# Patient Record
Sex: MALE | Race: WHITE | Employment: OTHER | URBAN - METROPOLITAN AREA
[De-identification: names, ages, dates, MRNs, and addresses within clinical notes are randomized per-mention and may not be internally consistent; named-entity substitution may affect disease eponyms.]

---

## 2020-12-18 ENCOUNTER — TELEPHONE (OUTPATIENT)
Dept: SURGERY | Age: 35
End: 2020-12-18

## 2020-12-18 NOTE — TELEPHONE ENCOUNTER
Left a voicemail on 12/18/20 for patient to return my call at their earliest convenience to reschedule their missed appointment that was scheduled for 12/18/20.

## 2021-01-13 NOTE — TELEPHONE ENCOUNTER
Called pt to get consult rescheduled, everything is on hold at this time as his wife was just diagnosed with cancer. He will call us back to reschedule when he is able.

## 2021-09-10 ENCOUNTER — HOSPITAL ENCOUNTER (OUTPATIENT)
Age: 36
Discharge: HOME | End: 2021-09-10

## 2021-09-10 PROCEDURE — 84403 ASSAY OF TOTAL TESTOSTERONE: CPT

## 2021-09-10 PROCEDURE — 84270 ASSAY OF SEX HORMONE GLOBUL: CPT

## 2021-09-14 LAB
SEX HORMONE BINDING GLOBULIN: 14 NMOL/L (ref 11–80)
TESTOSTERONE FREE-NONMALE: 72.8 PG/ML (ref 47–244)
TESTOSTERONE TOTAL: 252 NG/DL (ref 220–1000)

## 2021-11-02 ENCOUNTER — OFFICE VISIT (OUTPATIENT)
Dept: FAMILY MEDICINE CLINIC | Age: 36
End: 2021-11-02

## 2021-11-02 VITALS
DIASTOLIC BLOOD PRESSURE: 88 MMHG | SYSTOLIC BLOOD PRESSURE: 134 MMHG | OXYGEN SATURATION: 98 % | HEIGHT: 75 IN | TEMPERATURE: 98 F | RESPIRATION RATE: 16 BRPM | HEART RATE: 102 BPM | WEIGHT: 315 LBS | BODY MASS INDEX: 39.17 KG/M2

## 2021-11-02 DIAGNOSIS — B96.89 ACUTE BACTERIAL SINUSITIS: Primary | ICD-10-CM

## 2021-11-02 DIAGNOSIS — J01.90 ACUTE BACTERIAL SINUSITIS: Primary | ICD-10-CM

## 2021-11-02 PROCEDURE — 99213 OFFICE O/P EST LOW 20 MIN: CPT | Performed by: FAMILY MEDICINE

## 2021-11-02 RX ORDER — AMOXICILLIN AND CLAVULANATE POTASSIUM 875; 125 MG/1; MG/1
1 TABLET, FILM COATED ORAL 2 TIMES DAILY
Qty: 20 TABLET | Refills: 0 | Status: SHIPPED
Start: 2021-11-02 | End: 2021-11-05 | Stop reason: ALTCHOICE

## 2021-11-02 RX ORDER — SILDENAFIL CITRATE 20 MG/1
TABLET ORAL
COMMUNITY
Start: 2021-10-08

## 2021-11-02 RX ORDER — BUSPIRONE HYDROCHLORIDE 10 MG/1
TABLET ORAL
COMMUNITY
Start: 2021-10-20

## 2021-11-02 RX ORDER — TELMISARTAN 80 MG/1
TABLET ORAL
COMMUNITY
Start: 2021-09-03

## 2021-11-02 RX ORDER — HYDROCHLOROTHIAZIDE 25 MG/1
TABLET ORAL
COMMUNITY
Start: 2021-09-02

## 2021-11-02 RX ORDER — ESCITALOPRAM OXALATE 20 MG/1
TABLET ORAL
COMMUNITY

## 2021-11-02 RX ORDER — METFORMIN HYDROCHLORIDE 500 MG/1
TABLET, EXTENDED RELEASE ORAL
COMMUNITY
Start: 2021-10-08

## 2021-11-02 RX ORDER — METOPROLOL SUCCINATE 100 MG/1
TABLET, EXTENDED RELEASE ORAL
COMMUNITY
Start: 2021-09-02

## 2021-11-02 ASSESSMENT — ENCOUNTER SYMPTOMS
DIARRHEA: 0
COUGH: 1
WHEEZING: 0
VOMITING: 0
SHORTNESS OF BREATH: 0
SINUS PRESSURE: 1
RHINORRHEA: 1
EYE REDNESS: 0
SORE THROAT: 1
SINUS COMPLAINT: 1
NAUSEA: 0

## 2021-11-02 NOTE — PROGRESS NOTES
Chief Complaint:     Chief Complaint   Patient presents with    Sinus Problem     x3 days, ha dnegative covid saturday    Drainage         Sinus Problem  This is a new problem. The current episode started in the past 7 days. The problem is unchanged. There has been no fever. The pain is mild. Associated symptoms include congestion, coughing, ear pain, headaches, sinus pressure and a sore throat. Pertinent negatives include no chills or shortness of breath. Past treatments include nothing. The treatment provided no relief. There is no problem list on file for this patient. Past Medical History:   Diagnosis Date    Hypertension        Past Surgical History:   Procedure Laterality Date    MYRINGOTOMY      x 5       Current Outpatient Medications   Medication Sig Dispense Refill    escitalopram (LEXAPRO) 20 MG tablet escitalopram 20 mg tablet   TAKE 1 TABLET BY MOUTH EVERY DAY      busPIRone (BUSPAR) 10 MG tablet TAKE 1 TABLET BY MOUTH TWICE DAILY AS NEEDED      metoprolol succinate (TOPROL XL) 100 MG extended release tablet TAKE 1 TABLET BY MOUTH EVERY DAY      hydroCHLOROthiazide (HYDRODIURIL) 25 MG tablet TAKE 1 TABLET BY MOUTH EVERY DAY      telmisartan (MICARDIS) 80 MG tablet TAKE 1 TABLET BY MOUTH EVERY DAY      metFORMIN (GLUCOPHAGE-XR) 500 MG extended release tablet TAKE 1 TABLET BY MOUTH DAILY AFTER DINNER. IF TOLERATED INCREASE BY 1 TABLET EVERY 7 DAYS UP TO 2 TABLETS AFTER LUNCH AND 2 TABLETS AFTER DINNER      sildenafil (REVATIO) 20 MG tablet TAKE 1 TABLET BY MOUTH FIVE TIMES DAILY AS NEEDED      amoxicillin-clavulanate (AUGMENTIN) 875-125 MG per tablet Take 1 tablet by mouth 2 times daily for 10 days 20 tablet 0     No current facility-administered medications for this visit.        Allergies   Allergen Reactions    Sulfa Antibiotics Anaphylaxis       Social History     Socioeconomic History    Marital status: Single     Spouse name: Not on file    Number of children: Not on file  Years of education: Not on file    Highest education level: Not on file   Occupational History    Not on file   Tobacco Use    Smoking status: Former Smoker    Smokeless tobacco: Current User   Substance and Sexual Activity    Alcohol use: No     Comment: occ    Drug use: No    Sexual activity: Not on file   Other Topics Concern    Not on file   Social History Narrative    Not on file     Social Determinants of Health     Financial Resource Strain:     Difficulty of Paying Living Expenses:    Food Insecurity:     Worried About Running Out of Food in the Last Year:     [address] in the Last Year:    Transportation Needs:     Lack of Transportation (Medical):  Lack of Transportation (Non-Medical):    Physical Activity:     Days of Exercise per Week:     Minutes of Exercise per Session:    Stress:     Feeling of Stress :    Social Connections:     Frequency of Communication with Friends and Family:     Frequency of Social Gatherings with Friends and Family:     Attends [employer] Services:     Active Member of Clubs or Organizations:     Attends Club or Organization Meetings:     Marital Status:    Intimate Partner Violence:     Fear of Current or Ex-Partner:     Emotionally Abused:     Physically Abused:     Sexually Abused:        No family history on file. Review of Systems   Constitutional: Positive for fever. Negative for chills. HENT: Positive for congestion, ear pain, postnasal drip, rhinorrhea, sinus pressure and sore throat. Negative for ear discharge. Eyes: Negative for redness. Respiratory: Positive for cough. Negative for shortness of breath and wheezing. Gastrointestinal: Negative for diarrhea, nausea and vomiting. Skin: Negative for rash. Neurological: Positive for headaches. All other systems reviewed and are negative.         /88   Pulse 102   Temp 98 °F (36.7 °C)   Resp 16   Ht 6' 3\" (1.905 m)   Wt (!) 494 lb (224.1 kg)   SpO2 98% Findings: No erythema or rash. Neurological:      General: No focal deficit present. Mental Status: He is alert. Psychiatric:         Mood and Affect: Mood normal.                                 ASSESSMENT/PLAN:    There is no problem list on file for this patient. Noemí Case was seen today for sinus problem and drainage. Diagnoses and all orders for this visit:    Acute bacterial sinusitis    Other orders  -     amoxicillin-clavulanate (AUGMENTIN) 875-125 MG per tablet; Take 1 tablet by mouth 2 times daily for 10 days          Return if symptoms worsen or fail to improve. I spent 20 minutes with this patient. I spent greater than 50% of the time counseling this patient.         Ramiro Brown DO  11/2/2021  2:22 PM

## 2021-11-05 ENCOUNTER — OFFICE VISIT (OUTPATIENT)
Dept: FAMILY MEDICINE CLINIC | Age: 36
End: 2021-11-05
Payer: OTHER GOVERNMENT

## 2021-11-05 ENCOUNTER — HOSPITAL ENCOUNTER (OUTPATIENT)
Dept: INFUSION THERAPY | Age: 36
Setting detail: INFUSION SERIES
Discharge: HOME | End: 2021-11-05
Payer: OTHER GOVERNMENT

## 2021-11-05 VITALS
HEART RATE: 87 BPM | RESPIRATION RATE: 16 BRPM | OXYGEN SATURATION: 92 % | DIASTOLIC BLOOD PRESSURE: 60 MMHG | SYSTOLIC BLOOD PRESSURE: 130 MMHG | TEMPERATURE: 98 F

## 2021-11-05 VITALS
HEIGHT: 75 IN | BODY MASS INDEX: 39.17 KG/M2 | WEIGHT: 315 LBS | HEART RATE: 109 BPM | OXYGEN SATURATION: 93 % | SYSTOLIC BLOOD PRESSURE: 136 MMHG | DIASTOLIC BLOOD PRESSURE: 82 MMHG | TEMPERATURE: 99 F

## 2021-11-05 DIAGNOSIS — U07.1 COVID: Primary | ICD-10-CM

## 2021-11-05 DIAGNOSIS — U07.1 COVID-19: ICD-10-CM

## 2021-11-05 DIAGNOSIS — U07.1 COVID: ICD-10-CM

## 2021-11-05 PROCEDURE — M0243 CASIRIVI AND IMDEVI INFUSION: HCPCS

## 2021-11-05 PROCEDURE — 96365 THER/PROPH/DIAG IV INF INIT: CPT

## 2021-11-05 PROCEDURE — 2500000003 HC RX 250 WO HCPCS: Performed by: INTERNAL MEDICINE

## 2021-11-05 PROCEDURE — 99203 OFFICE O/P NEW LOW 30 MIN: CPT | Performed by: PHYSICIAN ASSISTANT

## 2021-11-05 PROCEDURE — 6360000002 HC RX W HCPCS: Performed by: INTERNAL MEDICINE

## 2021-11-05 PROCEDURE — 2580000003 HC RX 258: Performed by: INTERNAL MEDICINE

## 2021-11-05 RX ORDER — PREDNISONE 20 MG/1
40 TABLET ORAL DAILY
Qty: 10 TABLET | Refills: 0 | Status: ON HOLD
Start: 2021-11-05 | End: 2021-11-09 | Stop reason: HOSPADM

## 2021-11-05 RX ORDER — SODIUM CHLORIDE 0.9 % (FLUSH) 0.9 %
5-40 SYRINGE (ML) INJECTION PRN
Status: CANCELLED | OUTPATIENT
Start: 2021-11-05

## 2021-11-05 RX ORDER — SODIUM CHLORIDE 9 MG/ML
INJECTION, SOLUTION INTRAVENOUS CONTINUOUS
Status: CANCELLED | OUTPATIENT
Start: 2021-11-05

## 2021-11-05 RX ORDER — DIPHENHYDRAMINE HYDROCHLORIDE 50 MG/ML
50 INJECTION INTRAMUSCULAR; INTRAVENOUS ONCE
Status: CANCELLED | OUTPATIENT
Start: 2021-11-05 | End: 2021-11-05

## 2021-11-05 RX ORDER — AZITHROMYCIN 250 MG/1
250 TABLET, FILM COATED ORAL SEE ADMIN INSTRUCTIONS
Qty: 6 TABLET | Refills: 0 | Status: ON HOLD
Start: 2021-11-05 | End: 2021-11-09 | Stop reason: HOSPADM

## 2021-11-05 RX ORDER — SODIUM CHLORIDE 9 MG/ML
INJECTION, SOLUTION INTRAVENOUS CONTINUOUS
Status: DISCONTINUED | OUTPATIENT
Start: 2021-11-05 | End: 2021-11-06 | Stop reason: HOSPADM

## 2021-11-05 RX ORDER — SODIUM CHLORIDE 9 MG/ML
INJECTION, SOLUTION INTRAVENOUS CONTINUOUS
OUTPATIENT
Start: 2021-11-05

## 2021-11-05 RX ORDER — SODIUM CHLORIDE 0.9 % (FLUSH) 0.9 %
5-40 SYRINGE (ML) INJECTION PRN
Status: DISCONTINUED | OUTPATIENT
Start: 2021-11-05 | End: 2021-11-06 | Stop reason: HOSPADM

## 2021-11-05 RX ORDER — METHYLPREDNISOLONE SODIUM SUCCINATE 125 MG/2ML
125 INJECTION, POWDER, LYOPHILIZED, FOR SOLUTION INTRAMUSCULAR; INTRAVENOUS ONCE
Status: CANCELLED | OUTPATIENT
Start: 2021-11-05 | End: 2021-11-05

## 2021-11-05 RX ORDER — DIPHENHYDRAMINE HYDROCHLORIDE 50 MG/ML
50 INJECTION INTRAMUSCULAR; INTRAVENOUS ONCE
OUTPATIENT
Start: 2021-11-05 | End: 2021-11-05

## 2021-11-05 RX ORDER — METHYLPREDNISOLONE SODIUM SUCCINATE 125 MG/2ML
125 INJECTION, POWDER, LYOPHILIZED, FOR SOLUTION INTRAMUSCULAR; INTRAVENOUS ONCE
OUTPATIENT
Start: 2021-11-05 | End: 2021-11-05

## 2021-11-05 RX ORDER — EPINEPHRINE 1 MG/ML
0.3 INJECTION, SOLUTION, CONCENTRATE INTRAVENOUS PRN
Status: CANCELLED | OUTPATIENT
Start: 2021-11-05

## 2021-11-05 RX ORDER — BENZONATATE 100 MG/1
100 CAPSULE ORAL 3 TIMES DAILY PRN
Qty: 21 CAPSULE | Refills: 0 | Status: SHIPPED | OUTPATIENT
Start: 2021-11-05 | End: 2021-11-12

## 2021-11-05 RX ORDER — EPINEPHRINE 1 MG/ML
0.3 INJECTION, SOLUTION, CONCENTRATE INTRAVENOUS PRN
OUTPATIENT
Start: 2021-11-05

## 2021-11-05 NOTE — PROGRESS NOTES
21  Barbara Lewis : 1985 Sex: male  Age 39 y.o. Subjective:  Chief Complaint   Patient presents with    Shortness of Breath     Covid possible     Nasal Congestion    Cough    Fatigue    Other     dry mouth         HPI:   Barbara Lewis , 39 y.o. male presents to [facility] care for evaluation of shortness of breath, congestion, drainage    HPI  79-year-old male presents to [facility] for evaluation of Covid positive. The patient states that he did a at home test that was positive. His wife is positive for COVID-19. The patient was seen and evaluated on Monday and was diagnosed with a sinus infection given Augmentin. The patient is not vaccinated. The patient really has not had any fevers. The patient is not really short of breath. The patient has had a dry mouth associated. ROS:   Unless otherwise stated in this report the patient's positive and negative responses for review of systems for constitutional, eyes, ENT, cardiovascular, respiratory, gastrointestinal, neurological, , musculoskeletal, and integument systems and related systems to the presenting problem are either stated in the history of present illness or were not pertinent or were negative for the symptoms and/or complaints related to the presenting medical problem. Positives and pertinent negatives as per HPI. All others reviewed and are negative. PMH:     Past Medical History:   Diagnosis Date    Hypertension        Past Surgical History:   Procedure Laterality Date    MYRINGOTOMY      x 5       History reviewed. No pertinent family history.     Medications:     Current Outpatient Medications:     azithromycin (ZITHROMAX) 250 MG tablet, Take 1 tablet by mouth See Admin Instructions for 5 days 500mg on day 1 followed by 250mg on days 2 - 5, Disp: 6 tablet, Rfl: 0    benzonatate (TESSALON) 100 MG capsule, Take 1 capsule by mouth 3 times daily as needed for Cough, Disp: 21 capsule, Rfl: 0    predniSONE (DELTASONE) 20 MG tablet, Take 2 tablets by mouth daily for 5 days, Disp: 10 tablet, Rfl: 0    escitalopram (LEXAPRO) 20 MG tablet, escitalopram 20 mg tablet  TAKE 1 TABLET BY MOUTH EVERY DAY, Disp: , Rfl:     busPIRone (BUSPAR) 10 MG tablet, TAKE 1 TABLET BY MOUTH TWICE DAILY AS NEEDED, Disp: , Rfl:     metoprolol succinate (TOPROL XL) 100 MG extended release tablet, TAKE 1 TABLET BY MOUTH EVERY DAY, Disp: , Rfl:     hydroCHLOROthiazide (HYDRODIURIL) 25 MG tablet, TAKE 1 TABLET BY MOUTH EVERY DAY, Disp: , Rfl:     telmisartan (MICARDIS) 80 MG tablet, TAKE 1 TABLET BY MOUTH EVERY DAY, Disp: , Rfl:     metFORMIN (GLUCOPHAGE-XR) 500 MG extended release tablet, TAKE 1 TABLET BY MOUTH DAILY AFTER DINNER. IF TOLERATED INCREASE BY 1 TABLET EVERY 7 DAYS UP TO 2 TABLETS AFTER LUNCH AND 2 TABLETS AFTER DINNER, Disp: , Rfl:     sildenafil (REVATIO) 20 MG tablet, TAKE 1 TABLET BY MOUTH FIVE TIMES DAILY AS NEEDED, Disp: , Rfl:     Allergies: Allergies   Allergen Reactions    Sulfa Antibiotics Anaphylaxis       Social History:     Social History     Tobacco Use    Smoking status: Former Smoker    Smokeless tobacco: Current User   Substance Use Topics    Alcohol use: No     Comment: occ    Drug use: No       Patient lives at home. Physical Exam:     Vitals:    11/05/21 1150   BP: 136/82   Pulse: 109   Temp: 99 °F (37.2 °C)   SpO2: 93%   Weight: (!) 494 lb (224.1 kg)   Height: 6' 3\" (1.905 m)       Exam:  Physical Exam  Nurse's notes and vital signs reviewed. The patient is not hypoxic. ? General: Alert, no acute distress, patient resting comfortably Patient is not toxic or lethargic. Skin: Warm, intact, no pallor noted. There is no evidence of rash at this time. Head: Normocephalic, atraumatic  Eye: Normal conjunctiva  Ears, Nose, Throat: Right tympanic membrane clear, left tympanic membrane clear. No drainage or discharge noted. No pre- or post-auricular tenderness, erythema, or swelling noted.    Nasal congestion, rhinorrhea  Posterior oropharynx shows no erythema, tonsillar hypertrophy, or exudate. the uvula is midline. No trismus or drooling is noted. Moist mucous membranes. Neck: No anterior/posterior lymphadenopathy noted. No erythema, no masses, no fluctuance or induration noted. No meningeal signs. Cardiovascular: Regular Rate and Rhythm  Respiratory: No acute distress, no rhonchi, wheezing or crackles noted. No stridor or retractions are noted. Neurological: A&O x4, normal speech  Psychiatric: Cooperative         Testing:           Medical Decision Making:     Vital signs reviewed    Past medical history reviewed. Allergies reviewed. Medications reviewed. Patient on arrival does not appear to be in any apparent distress or discomfort. The patient has been seen and evaluated. The patient does not appear to be toxic or lethargic. The patient had vital signs reviewed and did have a pulse ox of 93%. He spoke in full complete sentences. The patient did have a slight tachycardia. The patient had a low-grade temp of 99. The patient is Covid positive. The patient will be sent for monoclonal antibody therapy to see if he is a candidate. The patient additionally will be started on a azithromycin and Tessalon Perles. The patient is to push fluids get plenty of rest.  The patient is to return if any of the signs or symptoms change or worsen. COVID-19 was detected as positive. Please have the patient quarantine, isolate. Call with any questions or concerns. Return if any of the signs or symptoms change or worsen for further evaluation and possible imaging/chest x-ray. Continue with vitamin regimen, fluids, rest.  Use Motrin, Tylenol. Monitor pulse ox. Follow-up with PCP or return to [facility] for evaluation. If symptoms worsen go directly to the ED    The patient is to return to express care or go directly to the emergency department should any of the signs or symptoms worsen. The patient is to followup with primary care physician in 2-3 days for repeat evaluation. The patient has no other questions or concerns at this time the patient will be discharged home. Clinical Impression:   Syed Guerra was seen today for shortness of breath, nasal congestion, cough, fatigue and other. Diagnoses and all orders for this visit:    COVID-19  -     azithromycin (ZITHROMAX) 250 MG tablet; Take 1 tablet by mouth See Admin Instructions for 5 days 500mg on day 1 followed by 250mg on days 2 - 5    Other orders  -     benzonatate (TESSALON) 100 MG capsule; Take 1 capsule by mouth 3 times daily as needed for Cough  -     predniSONE (DELTASONE) 20 MG tablet; Take 2 tablets by mouth daily for 5 days        The patient is to call for any concerns or return if any of the signs or symptoms worsen. The patient is to follow-up with PCP in the next 2-3 days for repeat evaluation repeat assessment or go directly to the emergency department.      SIGNATURE: Salina Levy III, PA-C

## 2021-11-06 ENCOUNTER — APPOINTMENT (OUTPATIENT)
Dept: GENERAL RADIOLOGY | Age: 36
End: 2021-11-06
Payer: OTHER GOVERNMENT

## 2021-11-06 ENCOUNTER — APPOINTMENT (OUTPATIENT)
Dept: CT IMAGING | Age: 36
End: 2021-11-06
Payer: OTHER GOVERNMENT

## 2021-11-06 ENCOUNTER — HOSPITAL ENCOUNTER (EMERGENCY)
Age: 36
Discharge: ANOTHER HOSPITAL | End: 2021-11-06
Attending: EMERGENCY MEDICINE
Payer: OTHER GOVERNMENT

## 2021-11-06 ENCOUNTER — HOSPITAL ENCOUNTER (INPATIENT)
Age: 36
LOS: 3 days | Discharge: HOME | DRG: 177 | End: 2021-11-09
Attending: INTERNAL MEDICINE | Admitting: INTERNAL MEDICINE

## 2021-11-06 VITALS
DIASTOLIC BLOOD PRESSURE: 73 MMHG | RESPIRATION RATE: 24 BRPM | SYSTOLIC BLOOD PRESSURE: 107 MMHG | TEMPERATURE: 99.6 F | BODY MASS INDEX: 62.05 KG/M2 | WEIGHT: 315 LBS | OXYGEN SATURATION: 96 % | HEART RATE: 90 BPM

## 2021-11-06 DIAGNOSIS — U07.1 COVID-19 VIRUS INFECTION: ICD-10-CM

## 2021-11-06 DIAGNOSIS — J96.01 ACUTE RESPIRATORY FAILURE WITH HYPOXIA (HCC): Primary | ICD-10-CM

## 2021-11-06 PROBLEM — E11.9 DIABETES (HCC): Status: ACTIVE | Noted: 2021-11-06

## 2021-11-06 PROBLEM — J12.82 PNEUMONIA DUE TO COVID-19 VIRUS: Status: ACTIVE | Noted: 2021-11-06

## 2021-11-06 PROBLEM — E87.1 HYPONATREMIA: Status: ACTIVE | Noted: 2021-11-06

## 2021-11-06 LAB
ALBUMIN SERPL-MCNC: 3.7 G/DL (ref 3.5–5.2)
ALP BLD-CCNC: 57 U/L (ref 40–129)
ALT SERPL-CCNC: 84 U/L (ref 0–40)
ANION GAP SERPL CALCULATED.3IONS-SCNC: 12 MMOL/L (ref 7–16)
AST SERPL-CCNC: 138 U/L (ref 0–39)
BASOPHILS ABSOLUTE: 0 E9/L (ref 0–0.2)
BASOPHILS RELATIVE PERCENT: 0 % (ref 0–2)
BILIRUB SERPL-MCNC: 0.5 MG/DL (ref 0–1.2)
BUN BLDV-MCNC: 13 MG/DL (ref 6–20)
CALCIUM SERPL-MCNC: 9.1 MG/DL (ref 8.6–10.2)
CHLORIDE BLD-SCNC: 88 MMOL/L (ref 98–107)
CHP ED QC CHECK: YES
CO2: 27 MMOL/L (ref 22–29)
CREAT SERPL-MCNC: 1 MG/DL (ref 0.7–1.2)
CREATININE URINE: 98 MG/DL (ref 40–278)
D DIMER: 338 NG/ML DDU
EOSINOPHILS ABSOLUTE: 0 E9/L (ref 0.05–0.5)
EOSINOPHILS RELATIVE PERCENT: 0 % (ref 0–6)
GFR AFRICAN AMERICAN: >60
GFR NON-AFRICAN AMERICAN: >60 ML/MIN/1.73
GLUCOSE BLD-MCNC: 243 MG/DL (ref 74–99)
GLUCOSE BLD-MCNC: 295 MG/DL
HBA1C MFR BLD: 8.3 % (ref 4–5.6)
HCT VFR BLD CALC: 41.7 % (ref 37–54)
HEMOGLOBIN: 13.8 G/DL (ref 12.5–16.5)
IMMATURE GRANULOCYTES #: 0.03 E9/L
IMMATURE GRANULOCYTES %: 0.6 % (ref 0–5)
LYMPHOCYTES ABSOLUTE: 0.62 E9/L (ref 1.5–4)
LYMPHOCYTES RELATIVE PERCENT: 13 % (ref 20–42)
MCH RBC QN AUTO: 25.9 PG (ref 26–35)
MCHC RBC AUTO-ENTMCNC: 33.1 % (ref 32–34.5)
MCV RBC AUTO: 78.4 FL (ref 80–99.9)
METER GLUCOSE: 239 MG/DL (ref 74–99)
METER GLUCOSE: 273 MG/DL (ref 74–99)
METER GLUCOSE: 295 MG/DL (ref 74–99)
METER GLUCOSE: 321 MG/DL (ref 74–99)
MONOCYTES ABSOLUTE: 0.19 E9/L (ref 0.1–0.95)
MONOCYTES RELATIVE PERCENT: 4 % (ref 2–12)
NEUTROPHILS ABSOLUTE: 3.94 E9/L (ref 1.8–7.3)
NEUTROPHILS RELATIVE PERCENT: 82.4 % (ref 43–80)
OSMOLALITY URINE: 447 MOSM/KG (ref 300–900)
OSMOLALITY: 278 MOSM/KG (ref 285–310)
PDW BLD-RTO: 14.4 FL (ref 11.5–15)
PLATELET # BLD: 187 E9/L (ref 130–450)
PMV BLD AUTO: 10.4 FL (ref 7–12)
POTASSIUM REFLEX MAGNESIUM: 4.1 MMOL/L (ref 3.5–5)
RBC # BLD: 5.32 E12/L (ref 3.8–5.8)
SARS-COV-2, NAAT: DETECTED
SODIUM BLD-SCNC: 127 MMOL/L (ref 132–146)
SODIUM URINE: <20 MMOL/L
TOTAL PROTEIN: 7 G/DL (ref 6.4–8.3)
TROPONIN, HIGH SENSITIVITY: 14 NG/L (ref 0–11)
WBC # BLD: 4.8 E9/L (ref 4.5–11.5)

## 2021-11-06 PROCEDURE — 83930 ASSAY OF BLOOD OSMOLALITY: CPT

## 2021-11-06 PROCEDURE — 2580000003 HC RX 258: Performed by: INTERNAL MEDICINE

## 2021-11-06 PROCEDURE — 82962 GLUCOSE BLOOD TEST: CPT

## 2021-11-06 PROCEDURE — 83036 HEMOGLOBIN GLYCOSYLATED A1C: CPT

## 2021-11-06 PROCEDURE — 36415 COLL VENOUS BLD VENIPUNCTURE: CPT

## 2021-11-06 PROCEDURE — 99285 EMERGENCY DEPT VISIT HI MDM: CPT

## 2021-11-06 PROCEDURE — 2700000000 HC OXYGEN THERAPY PER DAY

## 2021-11-06 PROCEDURE — 96374 THER/PROPH/DIAG INJ IV PUSH: CPT

## 2021-11-06 PROCEDURE — 84300 ASSAY OF URINE SODIUM: CPT

## 2021-11-06 PROCEDURE — 96372 THER/PROPH/DIAG INJ SC/IM: CPT

## 2021-11-06 PROCEDURE — 6370000000 HC RX 637 (ALT 250 FOR IP): Performed by: PHYSICIAN ASSISTANT

## 2021-11-06 PROCEDURE — 94664 DEMO&/EVAL PT USE INHALER: CPT

## 2021-11-06 PROCEDURE — 6370000000 HC RX 637 (ALT 250 FOR IP): Performed by: INTERNAL MEDICINE

## 2021-11-06 PROCEDURE — 6360000002 HC RX W HCPCS: Performed by: INTERNAL MEDICINE

## 2021-11-06 PROCEDURE — 87635 SARS-COV-2 COVID-19 AMP PRB: CPT

## 2021-11-06 PROCEDURE — 71046 X-RAY EXAM CHEST 2 VIEWS: CPT

## 2021-11-06 PROCEDURE — 83935 ASSAY OF URINE OSMOLALITY: CPT

## 2021-11-06 PROCEDURE — 84484 ASSAY OF TROPONIN QUANT: CPT

## 2021-11-06 PROCEDURE — 85378 FIBRIN DEGRADE SEMIQUANT: CPT

## 2021-11-06 PROCEDURE — 93005 ELECTROCARDIOGRAM TRACING: CPT | Performed by: EMERGENCY MEDICINE

## 2021-11-06 PROCEDURE — 2060000000 HC ICU INTERMEDIATE R&B

## 2021-11-06 PROCEDURE — 6370000000 HC RX 637 (ALT 250 FOR IP): Performed by: EMERGENCY MEDICINE

## 2021-11-06 PROCEDURE — 85025 COMPLETE CBC W/AUTO DIFF WBC: CPT

## 2021-11-06 PROCEDURE — 82570 ASSAY OF URINE CREATININE: CPT

## 2021-11-06 PROCEDURE — 6360000002 HC RX W HCPCS: Performed by: EMERGENCY MEDICINE

## 2021-11-06 PROCEDURE — 2580000003 HC RX 258: Performed by: PHYSICIAN ASSISTANT

## 2021-11-06 PROCEDURE — 80053 COMPREHEN METABOLIC PANEL: CPT

## 2021-11-06 RX ORDER — HYDROCHLOROTHIAZIDE 25 MG/1
25 TABLET ORAL DAILY
Status: DISCONTINUED | OUTPATIENT
Start: 2021-11-06 | End: 2021-11-09 | Stop reason: HOSPADM

## 2021-11-06 RX ORDER — ONDANSETRON 2 MG/ML
4 INJECTION INTRAMUSCULAR; INTRAVENOUS EVERY 6 HOURS PRN
Status: DISCONTINUED | OUTPATIENT
Start: 2021-11-06 | End: 2021-11-06

## 2021-11-06 RX ORDER — SODIUM CHLORIDE 0.9 % (FLUSH) 0.9 %
5-40 SYRINGE (ML) INJECTION PRN
Status: DISCONTINUED | OUTPATIENT
Start: 2021-11-06 | End: 2021-11-09 | Stop reason: HOSPADM

## 2021-11-06 RX ORDER — ACETAMINOPHEN 325 MG/1
650 TABLET ORAL ONCE
Status: COMPLETED | OUTPATIENT
Start: 2021-11-06 | End: 2021-11-06

## 2021-11-06 RX ORDER — SODIUM CHLORIDE 9 MG/ML
25 INJECTION, SOLUTION INTRAVENOUS PRN
Status: DISCONTINUED | OUTPATIENT
Start: 2021-11-06 | End: 2021-11-09 | Stop reason: HOSPADM

## 2021-11-06 RX ORDER — METOPROLOL SUCCINATE 100 MG/1
100 TABLET, EXTENDED RELEASE ORAL DAILY
Status: DISCONTINUED | OUTPATIENT
Start: 2021-11-06 | End: 2021-11-09 | Stop reason: HOSPADM

## 2021-11-06 RX ORDER — DEXTROSE MONOHYDRATE 50 MG/ML
100 INJECTION, SOLUTION INTRAVENOUS PRN
Status: DISCONTINUED | OUTPATIENT
Start: 2021-11-06 | End: 2021-11-09 | Stop reason: HOSPADM

## 2021-11-06 RX ORDER — ACETAMINOPHEN 650 MG/1
650 SUPPOSITORY RECTAL EVERY 6 HOURS PRN
Status: DISCONTINUED | OUTPATIENT
Start: 2021-11-06 | End: 2021-11-06 | Stop reason: SDUPTHER

## 2021-11-06 RX ORDER — ONDANSETRON 4 MG/1
4 TABLET, ORALLY DISINTEGRATING ORAL EVERY 8 HOURS PRN
Status: DISCONTINUED | OUTPATIENT
Start: 2021-11-06 | End: 2021-11-09 | Stop reason: HOSPADM

## 2021-11-06 RX ORDER — SODIUM CHLORIDE 9 MG/ML
INJECTION, SOLUTION INTRAVENOUS CONTINUOUS
Status: DISCONTINUED | OUTPATIENT
Start: 2021-11-06 | End: 2021-11-08

## 2021-11-06 RX ORDER — DEXAMETHASONE SODIUM PHOSPHATE 10 MG/ML
6 INJECTION INTRAMUSCULAR; INTRAVENOUS ONCE
Status: COMPLETED | OUTPATIENT
Start: 2021-11-06 | End: 2021-11-06

## 2021-11-06 RX ORDER — SODIUM CHLORIDE 9 MG/ML
25 INJECTION, SOLUTION INTRAVENOUS PRN
Status: DISCONTINUED | OUTPATIENT
Start: 2021-11-06 | End: 2021-11-06 | Stop reason: SDUPTHER

## 2021-11-06 RX ORDER — BUSPIRONE HYDROCHLORIDE 10 MG/1
10 TABLET ORAL 2 TIMES DAILY
Status: DISCONTINUED | OUTPATIENT
Start: 2021-11-06 | End: 2021-11-09 | Stop reason: HOSPADM

## 2021-11-06 RX ORDER — SODIUM CHLORIDE 0.9 % (FLUSH) 0.9 %
5-40 SYRINGE (ML) INJECTION PRN
Status: DISCONTINUED | OUTPATIENT
Start: 2021-11-06 | End: 2021-11-06 | Stop reason: SDUPTHER

## 2021-11-06 RX ORDER — DEXTROSE MONOHYDRATE 25 G/50ML
12.5 INJECTION, SOLUTION INTRAVENOUS PRN
Status: DISCONTINUED | OUTPATIENT
Start: 2021-11-06 | End: 2021-11-09 | Stop reason: HOSPADM

## 2021-11-06 RX ORDER — SODIUM CHLORIDE 0.9 % (FLUSH) 0.9 %
5-40 SYRINGE (ML) INJECTION EVERY 12 HOURS SCHEDULED
Status: DISCONTINUED | OUTPATIENT
Start: 2021-11-06 | End: 2021-11-09 | Stop reason: HOSPADM

## 2021-11-06 RX ORDER — NICOTINE POLACRILEX 4 MG
15 LOZENGE BUCCAL PRN
Status: DISCONTINUED | OUTPATIENT
Start: 2021-11-06 | End: 2021-11-09 | Stop reason: HOSPADM

## 2021-11-06 RX ORDER — ESCITALOPRAM OXALATE 10 MG/1
20 TABLET ORAL DAILY
Status: DISCONTINUED | OUTPATIENT
Start: 2021-11-06 | End: 2021-11-09 | Stop reason: HOSPADM

## 2021-11-06 RX ORDER — ONDANSETRON 2 MG/ML
4 INJECTION INTRAMUSCULAR; INTRAVENOUS EVERY 6 HOURS PRN
Status: DISCONTINUED | OUTPATIENT
Start: 2021-11-06 | End: 2021-11-09 | Stop reason: HOSPADM

## 2021-11-06 RX ORDER — POLYETHYLENE GLYCOL 3350 17 G/17G
17 POWDER, FOR SOLUTION ORAL DAILY PRN
Status: DISCONTINUED | OUTPATIENT
Start: 2021-11-06 | End: 2021-11-06

## 2021-11-06 RX ORDER — ACETAMINOPHEN 325 MG/1
650 TABLET ORAL EVERY 6 HOURS PRN
Status: DISCONTINUED | OUTPATIENT
Start: 2021-11-06 | End: 2021-11-09 | Stop reason: HOSPADM

## 2021-11-06 RX ORDER — HYDRALAZINE HYDROCHLORIDE 20 MG/ML
10 INJECTION INTRAMUSCULAR; INTRAVENOUS
Status: DISCONTINUED | OUTPATIENT
Start: 2021-11-06 | End: 2021-11-09 | Stop reason: HOSPADM

## 2021-11-06 RX ORDER — ACETAMINOPHEN 325 MG/1
650 TABLET ORAL EVERY 6 HOURS PRN
Status: DISCONTINUED | OUTPATIENT
Start: 2021-11-06 | End: 2021-11-06 | Stop reason: SDUPTHER

## 2021-11-06 RX ORDER — POLYETHYLENE GLYCOL 3350 17 G/17G
17 POWDER, FOR SOLUTION ORAL DAILY PRN
Status: DISCONTINUED | OUTPATIENT
Start: 2021-11-06 | End: 2021-11-09 | Stop reason: HOSPADM

## 2021-11-06 RX ORDER — GUAIFENESIN/DEXTROMETHORPHAN 100-10MG/5
5 SYRUP ORAL EVERY 4 HOURS PRN
Status: DISCONTINUED | OUTPATIENT
Start: 2021-11-06 | End: 2021-11-06

## 2021-11-06 RX ORDER — IPRATROPIUM BROMIDE AND ALBUTEROL SULFATE 2.5; .5 MG/3ML; MG/3ML
1 SOLUTION RESPIRATORY (INHALATION) EVERY 4 HOURS PRN
Status: DISCONTINUED | OUTPATIENT
Start: 2021-11-06 | End: 2021-11-09 | Stop reason: HOSPADM

## 2021-11-06 RX ORDER — GUAIFENESIN/DEXTROMETHORPHAN 100-10MG/5
5 SYRUP ORAL EVERY 4 HOURS PRN
Status: DISCONTINUED | OUTPATIENT
Start: 2021-11-06 | End: 2021-11-09 | Stop reason: HOSPADM

## 2021-11-06 RX ORDER — IBUPROFEN 800 MG/1
800 TABLET ORAL ONCE
Status: COMPLETED | OUTPATIENT
Start: 2021-11-06 | End: 2021-11-06

## 2021-11-06 RX ORDER — ONDANSETRON 4 MG/1
4 TABLET, ORALLY DISINTEGRATING ORAL EVERY 8 HOURS PRN
Status: DISCONTINUED | OUTPATIENT
Start: 2021-11-06 | End: 2021-11-06

## 2021-11-06 RX ORDER — SODIUM CHLORIDE 0.9 % (FLUSH) 0.9 %
5-40 SYRINGE (ML) INJECTION EVERY 12 HOURS SCHEDULED
Status: DISCONTINUED | OUTPATIENT
Start: 2021-11-06 | End: 2021-11-06 | Stop reason: SDUPTHER

## 2021-11-06 RX ORDER — LOSARTAN POTASSIUM 50 MG/1
100 TABLET ORAL DAILY
Status: DISCONTINUED | OUTPATIENT
Start: 2021-11-06 | End: 2021-11-09 | Stop reason: HOSPADM

## 2021-11-06 RX ORDER — ACETAMINOPHEN 650 MG/1
650 SUPPOSITORY RECTAL EVERY 6 HOURS PRN
Status: DISCONTINUED | OUTPATIENT
Start: 2021-11-06 | End: 2021-11-09 | Stop reason: HOSPADM

## 2021-11-06 RX ADMIN — SODIUM CHLORIDE, PRESERVATIVE FREE 10 ML: 5 INJECTION INTRAVENOUS at 11:55

## 2021-11-06 RX ADMIN — INSULIN LISPRO 2 UNITS: 100 INJECTION, SOLUTION INTRAVENOUS; SUBCUTANEOUS at 22:25

## 2021-11-06 RX ADMIN — IPRATROPIUM BROMIDE AND ALBUTEROL SULFATE 1 AMPULE: .5; 2.5 SOLUTION RESPIRATORY (INHALATION) at 18:01

## 2021-11-06 RX ADMIN — ESCITALOPRAM 20 MG: 10 TABLET, FILM COATED ORAL at 11:54

## 2021-11-06 RX ADMIN — ENOXAPARIN SODIUM 40 MG: 100 INJECTION SUBCUTANEOUS at 20:36

## 2021-11-06 RX ADMIN — Medication 10 ML: at 22:31

## 2021-11-06 RX ADMIN — SODIUM CHLORIDE: 9 INJECTION, SOLUTION INTRAVENOUS at 11:53

## 2021-11-06 RX ADMIN — ACETAMINOPHEN 650 MG: 325 TABLET ORAL at 01:45

## 2021-11-06 RX ADMIN — INSULIN LISPRO 8 UNITS: 100 INJECTION, SOLUTION INTRAVENOUS; SUBCUTANEOUS at 11:55

## 2021-11-06 RX ADMIN — BUSPIRONE HYDROCHLORIDE 10 MG: 10 TABLET ORAL at 20:36

## 2021-11-06 RX ADMIN — INSULIN LISPRO 4 UNITS: 100 INJECTION, SOLUTION INTRAVENOUS; SUBCUTANEOUS at 07:12

## 2021-11-06 RX ADMIN — INSULIN LISPRO 6 UNITS: 100 INJECTION, SOLUTION INTRAVENOUS; SUBCUTANEOUS at 16:31

## 2021-11-06 RX ADMIN — IBUPROFEN 800 MG: 800 TABLET, FILM COATED ORAL at 07:15

## 2021-11-06 RX ADMIN — BARICITINIB 4 MG: 2 TABLET, FILM COATED ORAL at 11:53

## 2021-11-06 RX ADMIN — DEXAMETHASONE SODIUM PHOSPHATE 6 MG: 10 INJECTION INTRAMUSCULAR; INTRAVENOUS at 01:45

## 2021-11-06 RX ADMIN — ENOXAPARIN SODIUM 220 MG: 100 INJECTION SUBCUTANEOUS at 05:53

## 2021-11-06 RX ADMIN — BUSPIRONE HYDROCHLORIDE 10 MG: 10 TABLET ORAL at 11:53

## 2021-11-06 RX ADMIN — METOPROLOL SUCCINATE 100 MG: 100 TABLET, EXTENDED RELEASE ORAL at 11:54

## 2021-11-06 ASSESSMENT — PAIN SCALES - GENERAL
PAINLEVEL_OUTOF10: 2
PAINLEVEL_OUTOF10: 0
PAINLEVEL_OUTOF10: 0
PAINLEVEL_OUTOF10: 2

## 2021-11-06 ASSESSMENT — PAIN DESCRIPTION - LOCATION: LOCATION: BACK

## 2021-11-06 NOTE — PROGRESS NOTES
-Consulted to dose baricitinib.  -Patient is on supplemental oxygen, steroids, and anticoagulation.  -Elevated D-dimer. -eGFR > 60  -Will initiate baricitinib 4 mg PO daily for 14 days or until hospital discharge.    -Spoke with Dr. Sandrine Groves, who confirmed no remdesivir.

## 2021-11-06 NOTE — PROGRESS NOTES
Pharmacy Documentation     Medication: Remdesivir     Date: 11/06/2021  Physician: Genny Leung consulted to initiate remdesivir. Patient does not currently meet [facility] Remdesivir Criteria for Use to initiate remdesivir based on patient is already receiving baricitinib. Pharmacy will sign off. Please re-consult if the clinical condition changes and patient meets criteria for initiation based on [facility] Remdesivir Criteria for Use.      Thank you for this consult,  willow jackman RP

## 2021-11-06 NOTE — ED PROVIDER NOTES
HPI:  11/6/21, Time: 12:49 AM EDT        Barbara Lewis is a 39 y.o. male presenting to the ED for shortness of breath known Covid positive, beginning 3 days ago. The complaint has been persistent, severe in severity, and worsened by walking. Patient upon arrival was 82% on room air. He states he has been symptomatic for 3 days. He did get an outpatient infusion yesterday for his ongoing Covid infection however when he can sleep tonight he became more short of breath he came to the emergency department. He denied chest pain just was very short of breath. He was immediately placed on nonrebreather mask which improved his oxygen saturation. .  Patient mitts to shortness of breath with cough and fatigue. Patient denies fever/chills, sore throat,  congestion, chest pain, edema, headache, visual disturbances, focal paresthesias, focal weakness, abdominal pain, nausea, vomiting, diarrhea, constipation, dysuria, hematuria, trauma, neck or back pain, rash or other complaints. ROS:   A complete review of systems was performed and all pertinent positives and negatives are stated within HPI, all other systems reviewed and are negative.            --------------------------------------------- PAST HISTORY ---------------------------------------------  Past Medical History:  has a past medical history of Hypertension. Past Surgical History:  has a past surgical history that includes myringotomy. Social History:  reports that he has quit smoking. He uses smokeless tobacco. He reports that he does not drink alcohol and does not use drugs. Family History: family history is not on file. The patients home medications have been reviewed.     Allergies: Sulfa antibiotics        ----------------------------------------PHYSICAL EXAM--------------------------------------    Constitutional:  Well developed, well nourished, obese, ill-appearing, Eyes:  PERRL, conjunctiva normal, EOMI  HENT:  Atraumatic, external ears normal, nose normal, oropharynx moist, no pharyngeal exudates. Neck- normal range of motion, no nuchal rigidity   Respiratory:   Respiratory distress, tachypneic, normal breath sounds, no rales, no wheezing   Cardiovascular:   Tachycardic rate, normal rhythm, no murmurs, no gallops, no rubs. Radial and DP pulses 2+ bilaterally. GI:  Soft, nondistended, normal bowel sounds, nontender, no organomegaly, no mass, no rebound, no guarding   :  No costovertebral angle tenderness   Musculoskeletal:  No edema, no tenderness, no deformities. Back- no tenderness  Integument:  Well hydrated, no rash. Adequate perfusion. Lymphatic:  No cervical lymphadenopathy noted   Neurologic:  Alert & oriented x 3, CN 2-12 normal, normal motor function, normal sensory function, no focal deficits noted. Normal gait. Psychiatric:  Speech and behavior appropriate       -------------------------------------------------- RESULTS -------------------------------------------------  I have personally reviewed all laboratory and imaging results for this patient. Results are listed below.      LABS:  Results for orders placed or performed during the hospital encounter of 11/06/21   COVID-19, Rapid   Result Value Ref Range    SARS-CoV-2, NAAT DETECTED (A) Not Detected   CBC Auto Differential   Result Value Ref Range    WBC 4.8 4.5 - 11.5 E9/L    RBC 5.32 3.80 - 5.80 E12/L    Hemoglobin 13.8 12.5 - 16.5 g/dL    Hematocrit 41.7 37.0 - 54.0 %    MCV 78.4 (L) 80.0 - 99.9 fL    MCH 25.9 (L) 26.0 - 35.0 pg    MCHC 33.1 32.0 - 34.5 %    RDW 14.4 11.5 - 15.0 fL    Platelets 367 799 - 439 E9/L    MPV 10.4 7.0 - 12.0 fL    Neutrophils % 82.4 (H) 43.0 - 80.0 %    Immature Granulocytes % 0.6 0.0 - 5.0 %    Lymphocytes % 13.0 (L) 20.0 - 42.0 %    Monocytes % 4.0 2.0 - 12.0 %    Eosinophils % 0.0 0.0 - 6.0 %    Basophils % 0.0 0.0 - 2.0 %    Neutrophils Absolute 3.94 1.80 - 7.30 E9/L    Immature Granulocytes # 0.03 E9/L    Lymphocytes Absolute 0.62 (L) 1.50 - 4.00 E9/L    Monocytes Absolute 0.19 0.10 - 0.95 E9/L    Eosinophils Absolute 0.00 (L) 0.05 - 0.50 E9/L    Basophils Absolute 0.00 0.00 - 0.20 E9/L   Comprehensive Metabolic Panel w/ Reflex to MG   Result Value Ref Range    Sodium 127 (L) 132 - 146 mmol/L    Potassium reflex Magnesium 4.1 3.5 - 5.0 mmol/L    Chloride 88 (L) 98 - 107 mmol/L    CO2 27 22 - 29 mmol/L    Anion Gap 12 7 - 16 mmol/L    Glucose 243 (H) 74 - 99 mg/dL    BUN 13 6 - 20 mg/dL    CREATININE 1.0 0.7 - 1.2 mg/dL    GFR Non-African American >60 >=60 mL/min/1.73    GFR African American >60     Calcium 9.1 8.6 - 10.2 mg/dL    Total Protein 7.0 6.4 - 8.3 g/dL    Albumin 3.7 3.5 - 5.2 g/dL    Total Bilirubin 0.5 0.0 - 1.2 mg/dL    Alkaline Phosphatase 57 40 - 129 U/L    ALT 84 (H) 0 - 40 U/L     (H) 0 - 39 U/L   D-Dimer, Quantitative   Result Value Ref Range    D-Dimer, Quant 338 ng/mL DDU   Troponin   Result Value Ref Range    Troponin, High Sensitivity 14 (H) 0 - 11 ng/L   EKG 12 Lead   Result Value Ref Range    Ventricular Rate 95 BPM    Atrial Rate 95 BPM    P-R Interval 138 ms    QRS Duration 86 ms    Q-T Interval 368 ms    QTc Calculation (Bazett) 462 ms    P Axis 15 degrees    R Axis 31 degrees    T Axis 5 degrees       RADIOLOGY:  Interpreted by Radiologist.  XR CHEST (2 VW)   Final Result   Patchy areas of consolidation scattered in the lungs, right greater than left. EKG Interpretation by Me  Time: 0102  Rhythm: normal sinus   Rate: normal  Axis: normal  Conduction: normal  ST Segments: no acute change  T Waves: no acute change  Clinical Impression: no acute changes  Comparison to prior EKG: no previous EKG      ------------------------- NURSING NOTES AND VITALS REVIEWED ---------------------------  The nursing notes within the ED encounter and vital signs as below have been reviewed by myself.     BP (!) 142/82   Pulse 92   Temp 98.7 °F (37.1 °C) (Temporal)   Resp 22   Wt (!) 496 lb 6.4 oz (225.2 kg)   SpO2 96%   BMI 62.05 kg/m²   Oxygen Saturation Interpretation: Abnormal      The patients available past medical records and past encounters were reviewed. ------------------------------ ED COURSE/MEDICAL DECISION MAKING----------------------  Medications   enoxaparin (LOVENOX) injection 220 mg (has no administration in time range)   acetaminophen (TYLENOL) tablet 650 mg (650 mg Oral Given 11/6/21 0145)   dexamethasone (DECADRON) injection 6 mg (6 mg IntraVENous Given 11/6/21 0145)     ED Course as of 11/06/21 0532   Sat Nov 06, 2021   0502 CT of the chest was ordered however patient is above the weight limit for our machine here. D-dimer was positive and spoke with the accepting physician at [facility] who will complete CT imaging at that point. Precautionary patient was given 1 mg/kg of Lovenox. [DM]      ED Course User Index  [DM] Ivania Francis DO       MEDICATIONS  New Prescriptions    No medications on file           Medical Decision Making:    Patient is a 58-year-old male unvaccinated presented emergency department respiratory distress. He was tachypneic tachycardic and saturating 82% on room air. Placed on nonrebreather mask upon arrival.  Patient was over the wait for CT imaging and CT chest could not be completed here. D-dimer was positive and patient be transferred to [facility] for CT imaging and admission to the hospital. Patient was given a therapeutic dose of Lovenox 1 mg/kg in the emergency department for treatment until CT could be completed. Patient continued to have no chest pain symptoms improved with nonrebreather mask.   Patient also given Decadron in the emergency department for his ongoing Covid infection and will be transferred to [facility]    This patient's ED course included: re-evaluation prior to disposition, multiple bedside re-evaluations, IV medications, cardiac monitoring, continuous pulse oximetry, complex medical decision making and emergency management and a personal history and physicial eaxmination    This patient has remained hemodynamically stable and been closely monitored during their ED course. Re-Evaluations:  Time: 0200   Re-evaluation. Patients symptoms show no change  Repeat physical examination is not changed      Consultations:   Spoke with The PA for Dr. Patrice Han, He will accept the transfer. Counseling: The emergency provider has spoken with the patient and discussed todays results, in addition to providing specific details for the plan of care and counseling regarding the diagnosis and prognosis. Questions are answered at this time and they are agreeable with the plan.    --------------------------- IMPRESSION AND DISPOSITION ---------------------------------    IMPRESSION  1.  Acute respiratory failure with hypoxia ([address]    2. COVID-19 virus infection        DISPOSITION  Disposition: Transfer to [facility] to [address] [place] [address]  Patient condition is stable              Cheyenne Davidson DO  Resident  11/06/21 [id number]

## 2021-11-07 LAB
ALBUMIN SERPL-MCNC: 3.7 G/DL (ref 3.5–5.2)
ALP BLD-CCNC: 58 U/L (ref 40–129)
ALT SERPL-CCNC: 89 U/L (ref 0–40)
ANION GAP SERPL CALCULATED.3IONS-SCNC: 11 MMOL/L (ref 7–16)
APTT: 33.3 SEC (ref 24.5–35.1)
AST SERPL-CCNC: 193 U/L (ref 0–39)
BASOPHILS ABSOLUTE: 0 E9/L (ref 0–0.2)
BASOPHILS RELATIVE PERCENT: 0 % (ref 0–2)
BILIRUB SERPL-MCNC: 0.6 MG/DL (ref 0–1.2)
BUN BLDV-MCNC: 16 MG/DL (ref 6–20)
CALCIUM SERPL-MCNC: 8.8 MG/DL (ref 8.6–10.2)
CHLORIDE BLD-SCNC: 88 MMOL/L (ref 98–107)
CO2: 26 MMOL/L (ref 22–29)
CREAT SERPL-MCNC: 0.9 MG/DL (ref 0.7–1.2)
EKG ATRIAL RATE: 95 BPM
EKG P AXIS: 15 DEGREES
EKG P-R INTERVAL: 138 MS
EKG Q-T INTERVAL: 368 MS
EKG QRS DURATION: 86 MS
EKG QTC CALCULATION (BAZETT): 462 MS
EKG R AXIS: 31 DEGREES
EKG T AXIS: 5 DEGREES
EKG VENTRICULAR RATE: 95 BPM
EOSINOPHILS ABSOLUTE: 0 E9/L (ref 0.05–0.5)
EOSINOPHILS RELATIVE PERCENT: 0 % (ref 0–6)
FERRITIN: 1606 NG/ML
FIBRINOGEN: 665 MG/DL (ref 225–540)
GFR AFRICAN AMERICAN: >60
GFR NON-AFRICAN AMERICAN: >60 ML/MIN/1.73
GLUCOSE BLD-MCNC: 266 MG/DL (ref 74–99)
HCT VFR BLD CALC: 42.6 % (ref 37–54)
HEMOGLOBIN: 13.7 G/DL (ref 12.5–16.5)
INR BLD: 1.1
LACTATE DEHYDROGENASE: 646 U/L (ref 135–225)
LACTIC ACID: 1.4 MMOL/L (ref 0.5–2.2)
LYMPHOCYTES ABSOLUTE: 0.75 E9/L (ref 1.5–4)
LYMPHOCYTES RELATIVE PERCENT: 14.8 % (ref 20–42)
MCH RBC QN AUTO: 25.6 PG (ref 26–35)
MCHC RBC AUTO-ENTMCNC: 32.2 % (ref 32–34.5)
MCV RBC AUTO: 79.5 FL (ref 80–99.9)
METER GLUCOSE: 236 MG/DL (ref 74–99)
METER GLUCOSE: 248 MG/DL (ref 74–99)
METER GLUCOSE: 275 MG/DL (ref 74–99)
METER GLUCOSE: 285 MG/DL (ref 74–99)
MONOCYTES ABSOLUTE: 0.2 E9/L (ref 0.1–0.95)
MONOCYTES RELATIVE PERCENT: 3.5 % (ref 2–12)
NEUTROPHILS ABSOLUTE: 4.1 E9/L (ref 1.8–7.3)
NEUTROPHILS RELATIVE PERCENT: 81.7 % (ref 43–80)
NUCLEATED RED BLOOD CELLS: 0 /100 WBC
PDW BLD-RTO: 14.4 FL (ref 11.5–15)
PLATELET # BLD: 232 E9/L (ref 130–450)
PMV BLD AUTO: 10.4 FL (ref 7–12)
POTASSIUM REFLEX MAGNESIUM: 4.4 MMOL/L (ref 3.5–5)
PROTHROMBIN TIME: 12.7 SEC (ref 9.3–12.4)
RBC # BLD: 5.36 E12/L (ref 3.8–5.8)
RBC # BLD: NORMAL 10*6/UL
SODIUM BLD-SCNC: 125 MMOL/L (ref 132–146)
TOTAL PROTEIN: 7.4 G/DL (ref 6.4–8.3)
TROPONIN, HIGH SENSITIVITY: 13 NG/L (ref 0–11)
WBC # BLD: 5 E9/L (ref 4.5–11.5)

## 2021-11-07 PROCEDURE — 83605 ASSAY OF LACTIC ACID: CPT

## 2021-11-07 PROCEDURE — 6360000002 HC RX W HCPCS: Performed by: INTERNAL MEDICINE

## 2021-11-07 PROCEDURE — 6370000000 HC RX 637 (ALT 250 FOR IP): Performed by: PHYSICIAN ASSISTANT

## 2021-11-07 PROCEDURE — 2580000003 HC RX 258: Performed by: PHYSICIAN ASSISTANT

## 2021-11-07 PROCEDURE — 6370000000 HC RX 637 (ALT 250 FOR IP): Performed by: INTERNAL MEDICINE

## 2021-11-07 PROCEDURE — 2580000003 HC RX 258: Performed by: INTERNAL MEDICINE

## 2021-11-07 PROCEDURE — 85730 THROMBOPLASTIN TIME PARTIAL: CPT

## 2021-11-07 PROCEDURE — 36415 COLL VENOUS BLD VENIPUNCTURE: CPT

## 2021-11-07 PROCEDURE — 6360000002 HC RX W HCPCS: Performed by: PHYSICIAN ASSISTANT

## 2021-11-07 PROCEDURE — 2060000000 HC ICU INTERMEDIATE R&B

## 2021-11-07 PROCEDURE — 82962 GLUCOSE BLOOD TEST: CPT

## 2021-11-07 PROCEDURE — 85025 COMPLETE CBC W/AUTO DIFF WBC: CPT

## 2021-11-07 PROCEDURE — 85610 PROTHROMBIN TIME: CPT

## 2021-11-07 PROCEDURE — 2700000000 HC OXYGEN THERAPY PER DAY

## 2021-11-07 PROCEDURE — 85384 FIBRINOGEN ACTIVITY: CPT

## 2021-11-07 PROCEDURE — 94640 AIRWAY INHALATION TREATMENT: CPT

## 2021-11-07 PROCEDURE — 83615 LACTATE (LD) (LDH) ENZYME: CPT

## 2021-11-07 PROCEDURE — 80053 COMPREHEN METABOLIC PANEL: CPT

## 2021-11-07 PROCEDURE — 84484 ASSAY OF TROPONIN QUANT: CPT

## 2021-11-07 PROCEDURE — 82728 ASSAY OF FERRITIN: CPT

## 2021-11-07 RX ADMIN — INSULIN LISPRO 3 UNITS: 100 INJECTION, SOLUTION INTRAVENOUS; SUBCUTANEOUS at 22:00

## 2021-11-07 RX ADMIN — INSULIN LISPRO 6 UNITS: 100 INJECTION, SOLUTION INTRAVENOUS; SUBCUTANEOUS at 11:44

## 2021-11-07 RX ADMIN — Medication 10 ML: at 09:10

## 2021-11-07 RX ADMIN — METOPROLOL SUCCINATE 100 MG: 100 TABLET, EXTENDED RELEASE ORAL at 09:10

## 2021-11-07 RX ADMIN — BUSPIRONE HYDROCHLORIDE 10 MG: 10 TABLET ORAL at 09:11

## 2021-11-07 RX ADMIN — BUSPIRONE HYDROCHLORIDE 10 MG: 10 TABLET ORAL at 21:53

## 2021-11-07 RX ADMIN — ENOXAPARIN SODIUM 40 MG: 100 INJECTION SUBCUTANEOUS at 21:52

## 2021-11-07 RX ADMIN — INSULIN LISPRO 4 UNITS: 100 INJECTION, SOLUTION INTRAVENOUS; SUBCUTANEOUS at 15:54

## 2021-11-07 RX ADMIN — ESCITALOPRAM 20 MG: 10 TABLET, FILM COATED ORAL at 09:10

## 2021-11-07 RX ADMIN — Medication 10 ML: at 21:53

## 2021-11-07 RX ADMIN — INSULIN LISPRO 6 UNITS: 100 INJECTION, SOLUTION INTRAVENOUS; SUBCUTANEOUS at 06:49

## 2021-11-07 RX ADMIN — BARICITINIB 4 MG: 2 TABLET, FILM COATED ORAL at 09:10

## 2021-11-07 RX ADMIN — ENOXAPARIN SODIUM 40 MG: 100 INJECTION SUBCUTANEOUS at 09:11

## 2021-11-07 RX ADMIN — DEXAMETHASONE 6 MG: 1 TABLET ORAL at 21:53

## 2021-11-07 RX ADMIN — SODIUM CHLORIDE: 9 INJECTION, SOLUTION INTRAVENOUS at 13:48

## 2021-11-07 RX ADMIN — DEXAMETHASONE 6 MG: 1 TABLET ORAL at 00:44

## 2021-11-07 RX ADMIN — IPRATROPIUM BROMIDE AND ALBUTEROL SULFATE 1 AMPULE: .5; 2.5 SOLUTION RESPIRATORY (INHALATION) at 02:19

## 2021-11-07 ASSESSMENT — PAIN SCALES - GENERAL
PAINLEVEL_OUTOF10: 0
PAINLEVEL_OUTOF10: 0

## 2021-11-07 NOTE — H&P
[address] [facility]  History and Physical      CHIEF COMPLAINT: Progressive shortness of breath    Reason for Admission: COVID-19 pneumonia    History Obtained From:  patient    HISTORY OF PRESENT ILLNESS:      The patient is a 39 y.o. male of Chavez Pandey DO with significant past medical history of obesity, hypertension, depression, non-insulin-dependent diabetes mellitus type 2 who presents with progressive shortness of breath after being diagnosed with a COVID-19 infection on 11/3/2021. At that time he was evaluated in the ER and sent home azithromycin and steroids. He presented 3 days later on 11/6/2021 with worsening shortness of breath and the need for oxygen to maintain saturations above 90%. On initial evaluation in the ER, he was noted to have low sodium at 127, elevated glucose of 243, mildly elevated troponin of 14, transaminitis with an elevation in his ALT to 84 and AST to 138. Of note, prior to transfer to our facility, he was noted to have an elevated D-dimer however could not go through CT imaging prior to transfer. At that time he was dosed with therapeutic dosing of Lovenox at 1 mg/kg in the emergency department until CT could have been completed. This report is not visible and I am unsure if this CT was actually done.     He denies loss of taste or sense of smell, diarrhea, rash      All labs personally reviewed   All imaging personally reviewed     Past Medical History:        Diagnosis Date    Hypertension      Past Surgical History:        Procedure Laterality Date    MYRINGOTOMY      x 5         Medications Prior to Admission:    Medications Prior to Admission: azithromycin (ZITHROMAX) 250 MG tablet, Take 1 tablet by mouth See Admin Instructions for 5 days 500mg on day 1 followed by 250mg on days 2 - 5  benzonatate (TESSALON) 100 MG capsule, Take 1 capsule by mouth 3 times daily as needed for Cough  predniSONE (DELTASONE) 20 MG tablet, Take 2 tablets by mouth daily for 5 days  escitalopram (LEXAPRO) 20 MG tablet, escitalopram 20 mg tablet  TAKE 1 TABLET BY MOUTH EVERY DAY  busPIRone (BUSPAR) 10 MG tablet, TAKE 1 TABLET BY MOUTH TWICE DAILY AS NEEDED  metoprolol succinate (TOPROL XL) 100 MG extended release tablet, TAKE 1 TABLET BY MOUTH EVERY DAY  hydroCHLOROthiazide (HYDRODIURIL) 25 MG tablet, TAKE 1 TABLET BY MOUTH EVERY DAY  telmisartan (MICARDIS) 80 MG tablet, TAKE 1 TABLET BY MOUTH EVERY DAY  metFORMIN (GLUCOPHAGE-XR) 500 MG extended release tablet, TAKE 1 TABLET BY MOUTH DAILY AFTER DINNER. IF TOLERATED INCREASE BY 1 TABLET EVERY 7 DAYS UP TO 2 TABLETS AFTER LUNCH AND 2 TABLETS AFTER DINNER  sildenafil (REVATIO) 20 MG tablet, TAKE 1 TABLET BY MOUTH FIVE TIMES DAILY AS NEEDED    Allergies:  Sulfa antibiotics    Social History:   TOBACCO:   reports that he has quit smoking. He uses smokeless tobacco.  ETOH:   reports no history of alcohol use. MARITAL STATUS:    OCCUPATION:      Family History:   No family history on file. REVIEW OF SYSTEMS:    General ROS: negative  Hematological and Lymphatic ROS: negative  Endocrine ROS: negative  Respiratory ROS: Endorses cough and shortness of breath. Does report some mild sputum production with spots and streaks of blood. Cardiovascular ROS: no chest pain or dyspnea on exertion  Gastrointestinal ROS: no abdominal pain, change in bowel habits, or black or bloody stools  Genito-Urinary ROS: no dysuria, trouble voiding, or hematuria  Neurological ROS: no TIA or stroke symptoms  negative    Vitals:  /72   Pulse 80   Temp 99 °F (37.2 °C) (Oral)   Resp 22   Ht 6' 3\" (1.905 m)   Wt (!) 489 lb 11.2 oz (222.1 kg)   SpO2 90%   BMI 61.21 kg/m²     PHYSICAL EXAM:  General Appearance:  awake, alert, oriented, in no acute distress  Head/face:  NCAT  Eyes:  No gross abnormalities. Lungs: Adequate air entry bilaterally, decreased as bibasilar, bilateral rhonchus sounds noted and some mild crackles scattered.   Heart:  Heart sounds are normal.  Regular rate and rhythm without murmur, gallop or rub. Abdomen: Obese, soft, nontender, nondistended, no rebound, no guarding. Positive bowel sounds. Extremities: +1 - trace edema bilaterally. Neurologic: No focal neurological deficits noted    DATA:     Recent Labs     11/06/21 0132   WBC 4.8   HGB 13.8        Recent Labs     11/06/21 0132   *   K 4.1   BUN 13   CREATININE 1.0     Recent Labs     11/06/21 0132   PROT 7.0     Recent Labs     11/06/21 0132   *   ALT 84*   ALKPHOS 57   BILITOT 0.5     No results for input(s): BNP in the last 72 hours. No results for input(s): CKTOTAL, CKMB, CKMBINDEX, TROPONINI in the last 72 hours. ASSESSMENT:      Active Problems:    Pneumonia due to COVID-19 virus  Acute hypoxic respiratory failure related to above. Hyponatremia    Diabetes ([address]  Morbid obesity  Depression  Hypertension  Elevated liver function test      PLAN:    Patient is admitted to the medical floor and placed on nasal cannula not requiring heated high flow oxygen therapy at this time. He has received baricitinib, placed on dexamethasone. We will continue to monitor his respiratory status and oxygen needs, I have asked him to utilize incentive spirometer and discussed laying prone with him as alveolar recruitment techniques. If he continues to worsen or require more oxygen, will consult pulmonology at that time. His D-dimer is mildly elevated at 338, currently is receiving full dose anticoagulation. We will continue to trend his inflammatory markers during this hospitalization and adjust his regimen and course of therapy as necessary. In the setting of his diabetes and receiving dexamethasone, his blood glucoses are obviously and expectedly elevated, I will increase his sliding scale insulin to medium dose from low-dose and continue to monitor his blood sugars closely.   We will resume his antihypertensives with parameters in place however his hydrochlorothiazide and Cozaar have been held since admission and will continue to do so for now. Any elevation of blood pressure should be addressed with as needed medications if necessary, IV hydralazine for systolic blood pressure greater than 160. DVT prophylaxis Lovenox  PT OT as needed to assist with rehab needs  Discharge plan unknown discharge time, pending clinical course.     Noe Nelson MD  11/6/2021  9:39 PM

## 2021-11-07 NOTE — PROGRESS NOTES
The patient is a 39 y.o. [facility] with significant past medical history of obesity, hypertension, depression, non-insulin-dependent diabetes mellitus type 2 who presents with progressive shortness of breath after being diagnosed with a COVID-19 infection on 11/3/2021. At that time he was evaluated in the ER and sent home azithromycin and steroids. He presented 3 days later on 11/6/2021 with worsening shortness of breath and the need for oxygen to maintain saturations above 90%.     On initial evaluation in the ER, he was noted to have low sodium at 127, elevated glucose of 243, mildly elevated troponin of 14, transaminitis with an elevation in his ALT to 84 and AST to 138. Of note, prior to transfer to our facility, he was noted to have an elevated D-dimer however could not go through CT imaging prior to transfer. At that time he was dosed with therapeutic dosing of Lovenox at 1 mg/kg in the emergency department until CT could have been completed. This report is not visible and I am unsure if this CT was actually done. During the admission he was also started on baricitinib, Decadron, full dose Lovenox. Subjective:  Azael Daniels was seen today resting bed comfortably no acute distress. He is noted to be up to 7 L of oxygen, up from 3 L yesterday. He states the [employer] have been working in inducing a productive cough for him. He was noted to have been notably hypoxic on ambulation and nursing also noted he was hypoxic when in deep sleep. We did discuss how he likely does have obstructive sleep apnea and would need a CPAP machine or at the very least nocturnal oxygen. He states he is aware of this and has been looking to get a work-up done however not at the time.       Objective:    /67   Pulse 74   Temp 98.4 °F (36.9 °C) (Oral)   Resp 22   Ht 6' 3\" (1.905 m)   Wt (!) 488 lb 12.8 oz (221.7 kg)   SpO2 91%   BMI 61.10 kg/m²     In: 1055 [I.V.:1055]  Out: -   In: 1055 Out: -     General ROS: negative  Hematological and Lymphatic ROS: negative  Endocrine ROS: negative  Respiratory ROS: Endorses cough and shortness of breath. Does report some mild sputum production with spots and streaks of blood. Cardiovascular ROS: no chest pain or dyspnea on exertion  Gastrointestinal ROS: no abdominal pain, change in bowel habits, or black or bloody stools  Genito-Urinary ROS: no dysuria, trouble voiding, or hematuria  Neurological ROS: no TIA or stroke symptoms  negative     Recent Labs     11/06/21  0132 11/07/21  0320   WBC 4.8 5.0   HGB 13.8 13.7   HCT 41.7 42.6    232       Recent Labs     11/06/21  0132 11/07/21  0320   * 125*   K 4.1 4.4   CL 88* 88*   CO2 27 26   BUN 13 16   CREATININE 1.0 0.9   CALCIUM 9.1 8.8       Assessment:       Active Problems:    Pneumonia due to COVID-19 virus  Acute hypoxic respiratory failure related to above. Hyponatremia    Diabetes ([facility] [address]  Morbid obesity  Depression  Hypertension  Elevated liver function test  Hyperglycemia like related to steroid use         Plan:    Olesya Smith remains on nasal cannula however up to about 7 L, increased from 3 L overnight. Was noted to be hypoxic with ambulation and deep sleep. For now, he continues on baricitinib, steroids and Lovenox. Liver function tests have shown an increase, I will order right upper quadrant ultrasound to evaluate for this. His sodium level is at 125 today, slightly lower than yesterday at 127. We will continue to give him IV fluids and continue to monitors closely. He does not have any altered mental status and no seizure-like activity noted. His low sodium is unlikely to affect him clinically this admission for now. We will continue to monitor patient very closely, checking his vital signs, kidney function, electrolytes and liver function tests daily. If patient deteriorates clinically, will consider consulting pulmonology for further input.   We did have a lengthy discussion about his obstructive sleep apnea and that has been undiagnosed but he likely has. He does report daytime sleepiness, snoring in the middle at night reported to him by his partner which are typical signs for obstructive sleep apnea. I did encourage him to get outpatient sleep study soon as possible so insurance may cover his CPAP machine. He is willing to do so after he recovers from Covid. I also note that his sugar is climbing up, likely due to his steroid. I have increased his sliding selegiline for medium dose to high-dose, this also may be inducing pseudohyponatremia as of noted above his sodium did decrease from 1 27-1 25 however this is clinically relevant to the moment      DVT prophylaxis Lovenox  PT OT as needed to assist with rehab needs  Discharge plan unknown discharge time, pending clinical course.     Rachel Zhao MD  5:38 PM  11/7/2021

## 2021-11-08 ENCOUNTER — APPOINTMENT (OUTPATIENT)
Dept: ULTRASOUND IMAGING | Age: 36
DRG: 177 | End: 2021-11-08
Attending: INTERNAL MEDICINE

## 2021-11-08 LAB
ALBUMIN SERPL-MCNC: 3.6 G/DL (ref 3.5–5.2)
ALP BLD-CCNC: 58 U/L (ref 40–129)
ALT SERPL-CCNC: 89 U/L (ref 0–40)
ANION GAP SERPL CALCULATED.3IONS-SCNC: 11 MMOL/L (ref 7–16)
AST SERPL-CCNC: 160 U/L (ref 0–39)
ATYPICAL LYMPHOCYTE RELATIVE PERCENT: 3 % (ref 0–4)
BASOPHILS ABSOLUTE: 0 E9/L (ref 0–0.2)
BASOPHILS RELATIVE PERCENT: 0 % (ref 0–2)
BILIRUB SERPL-MCNC: 0.4 MG/DL (ref 0–1.2)
BUN BLDV-MCNC: 14 MG/DL (ref 6–20)
CALCIUM SERPL-MCNC: 8.6 MG/DL (ref 8.6–10.2)
CHLORIDE BLD-SCNC: 98 MMOL/L (ref 98–107)
CO2: 26 MMOL/L (ref 22–29)
CREAT SERPL-MCNC: 0.8 MG/DL (ref 0.7–1.2)
EOSINOPHILS ABSOLUTE: 0 E9/L (ref 0.05–0.5)
EOSINOPHILS RELATIVE PERCENT: 0 % (ref 0–6)
GFR AFRICAN AMERICAN: >60
GFR NON-AFRICAN AMERICAN: >60 ML/MIN/1.73
GLUCOSE BLD-MCNC: 326 MG/DL (ref 74–99)
HCT VFR BLD CALC: 43 % (ref 37–54)
HEMOGLOBIN: 13.8 G/DL (ref 12.5–16.5)
LYMPHOCYTES ABSOLUTE: 0.67 E9/L (ref 1.5–4)
LYMPHOCYTES RELATIVE PERCENT: 15 % (ref 20–42)
MCH RBC QN AUTO: 25.8 PG (ref 26–35)
MCHC RBC AUTO-ENTMCNC: 32.1 % (ref 32–34.5)
MCV RBC AUTO: 80.5 FL (ref 80–99.9)
METER GLUCOSE: 272 MG/DL (ref 74–99)
METER GLUCOSE: 296 MG/DL (ref 74–99)
MONOCYTES ABSOLUTE: 0.3 E9/L (ref 0.1–0.95)
MONOCYTES RELATIVE PERCENT: 8 % (ref 2–12)
NEUTROPHILS ABSOLUTE: 2.74 E9/L (ref 1.8–7.3)
NEUTROPHILS RELATIVE PERCENT: 74 % (ref 43–80)
PDW BLD-RTO: 14.7 FL (ref 11.5–15)
PLATELET # BLD: 265 E9/L (ref 130–450)
PMV BLD AUTO: 9.8 FL (ref 7–12)
POTASSIUM REFLEX MAGNESIUM: 4.8 MMOL/L (ref 3.5–5)
RBC # BLD: 5.34 E12/L (ref 3.8–5.8)
RBC # BLD: NORMAL 10*6/UL
SODIUM BLD-SCNC: 135 MMOL/L (ref 132–146)
TOTAL PROTEIN: 6.8 G/DL (ref 6.4–8.3)
WBC # BLD: 3.7 E9/L (ref 4.5–11.5)

## 2021-11-08 PROCEDURE — 2580000003 HC RX 258: Performed by: INTERNAL MEDICINE

## 2021-11-08 PROCEDURE — 76705 ECHO EXAM OF ABDOMEN: CPT

## 2021-11-08 PROCEDURE — 36415 COLL VENOUS BLD VENIPUNCTURE: CPT

## 2021-11-08 PROCEDURE — 94640 AIRWAY INHALATION TREATMENT: CPT

## 2021-11-08 PROCEDURE — 82962 GLUCOSE BLOOD TEST: CPT

## 2021-11-08 PROCEDURE — 80053 COMPREHEN METABOLIC PANEL: CPT

## 2021-11-08 PROCEDURE — 2060000000 HC ICU INTERMEDIATE R&B

## 2021-11-08 PROCEDURE — 6360000002 HC RX W HCPCS: Performed by: INTERNAL MEDICINE

## 2021-11-08 PROCEDURE — 6370000000 HC RX 637 (ALT 250 FOR IP): Performed by: INTERNAL MEDICINE

## 2021-11-08 PROCEDURE — 2700000000 HC OXYGEN THERAPY PER DAY

## 2021-11-08 PROCEDURE — 6370000000 HC RX 637 (ALT 250 FOR IP): Performed by: PHYSICIAN ASSISTANT

## 2021-11-08 PROCEDURE — 6360000002 HC RX W HCPCS: Performed by: PHYSICIAN ASSISTANT

## 2021-11-08 PROCEDURE — 85025 COMPLETE CBC W/AUTO DIFF WBC: CPT

## 2021-11-08 RX ADMIN — Medication 10 ML: at 11:31

## 2021-11-08 RX ADMIN — BUSPIRONE HYDROCHLORIDE 10 MG: 10 TABLET ORAL at 20:37

## 2021-11-08 RX ADMIN — BARICITINIB 4 MG: 2 TABLET, FILM COATED ORAL at 11:30

## 2021-11-08 RX ADMIN — METOPROLOL SUCCINATE 100 MG: 100 TABLET, EXTENDED RELEASE ORAL at 11:30

## 2021-11-08 RX ADMIN — INSULIN LISPRO 12 UNITS: 100 INJECTION, SOLUTION INTRAVENOUS; SUBCUTANEOUS at 16:25

## 2021-11-08 RX ADMIN — IPRATROPIUM BROMIDE AND ALBUTEROL SULFATE 1 AMPULE: .5; 2.5 SOLUTION RESPIRATORY (INHALATION) at 20:52

## 2021-11-08 RX ADMIN — ESCITALOPRAM 20 MG: 10 TABLET, FILM COATED ORAL at 11:31

## 2021-11-08 RX ADMIN — INSULIN LISPRO 5 UNITS: 100 INJECTION, SOLUTION INTRAVENOUS; SUBCUTANEOUS at 20:37

## 2021-11-08 RX ADMIN — ENOXAPARIN SODIUM 40 MG: 100 INJECTION SUBCUTANEOUS at 20:37

## 2021-11-08 RX ADMIN — Medication 10 ML: at 20:37

## 2021-11-08 RX ADMIN — BUSPIRONE HYDROCHLORIDE 10 MG: 10 TABLET ORAL at 11:31

## 2021-11-08 RX ADMIN — ENOXAPARIN SODIUM 40 MG: 100 INJECTION SUBCUTANEOUS at 11:31

## 2021-11-08 RX ADMIN — INSULIN LISPRO 9 UNITS: 100 INJECTION, SOLUTION INTRAVENOUS; SUBCUTANEOUS at 11:15

## 2021-11-08 RX ADMIN — DEXAMETHASONE 6 MG: 1 TABLET ORAL at 11:30

## 2021-11-08 ASSESSMENT — PAIN SCALES - GENERAL
PAINLEVEL_OUTOF10: 0

## 2021-11-08 NOTE — ACP (ADVANCE CARE PLANNING)
Advance Care Planning     Advance Care Planning Activator (Inpatient)  Conversation Note      Date of ACP Conversation: 11/8/2021     Conversation Conducted with: Patient with Decision Making Capacity    ACP Activator: Chely Mary, [address] [facility] Decision Maker:     Current Designated Health Care Decision Maker:     Primary Decision Maker: Paolo Howe - Spouse - [phone]  Click here to complete [address] including section of the Healthcare Decision Maker Relationship (ie \"Primary\")  Today we documented Decision Maker(s) consistent with Legal Next of Kin hierarchy. Care Preferences    Ventilation: \"If you were in your present state of health and suddenly became very ill and were unable to breathe on your own, what would your preference be about the use of a ventilator (breathing machine) if it were available to you? \"      Would the patient desire the use of ventilator (breathing machine)?: Yes    \"If your health worsens and it becomes clear that your chance of recovery is unlikely, what would your preference be about the use of a ventilator (breathing machine) if it were available to you? \"     Would the patient desire the use of ventilator (breathing machine)?: No      Resuscitation  \"CPR works best to restart the heart when there is a sudden event, like a heart attack, in someone who is otherwise healthy. Unfortunately, CPR does not typically restart the heart for people who have serious health conditions or who are very sick. \"    \"In the event your heart stopped as a result of an underlying serious health condition, would you want attempts to be made to restart your heart (answer \"yes\" for attempt to resuscitate) or would you prefer a natural death (answer \"no\" for do not attempt to resuscitate)? \" yes       [] Yes   [x] No   Educated Patient / Gloria Sam regarding differences between Advance Directives and portable DNR orders.     Length of ACP Conversation in minutes:  10 minutes    Conversation Outcomes:  [x] ACP discussion completed  [] Existing advance directive reviewed with patient; no changes to patient's previously recorded wishes  [] New Advance Directive completed  [] Portable Do Not Rescitate prepared for Provider review and signature  [] POLST/POST/MOLST/MOST prepared for Provider review and signature      Follow-up plan:    [x] Schedule follow-up conversation to continue planning  [] Referred individual to Provider for additional questions/concerns   [] Advised patient/agent/surrogate to review completed ACP document and update if needed with changes in condition, patient preferences or care setting    [] This note routed to one or more involved healthcare providers

## 2021-11-09 ENCOUNTER — TELEPHONE (OUTPATIENT)
Dept: PRIMARY CARE CLINIC | Age: 36
End: 2021-11-09

## 2021-11-09 VITALS
TEMPERATURE: 98.1 F | RESPIRATION RATE: 22 BRPM | HEART RATE: 62 BPM | WEIGHT: 315 LBS | BODY MASS INDEX: 39.17 KG/M2 | DIASTOLIC BLOOD PRESSURE: 90 MMHG | HEIGHT: 75 IN | OXYGEN SATURATION: 94 % | SYSTOLIC BLOOD PRESSURE: 146 MMHG

## 2021-11-09 LAB
ALBUMIN SERPL-MCNC: 3.7 G/DL (ref 3.5–5.2)
ALP BLD-CCNC: 58 U/L (ref 40–129)
ALT SERPL-CCNC: 89 U/L (ref 0–40)
ANION GAP SERPL CALCULATED.3IONS-SCNC: 12 MMOL/L (ref 7–16)
AST SERPL-CCNC: 134 U/L (ref 0–39)
BASOPHILS ABSOLUTE: 0 E9/L (ref 0–0.2)
BASOPHILS RELATIVE PERCENT: 0 % (ref 0–2)
BILIRUB SERPL-MCNC: 0.5 MG/DL (ref 0–1.2)
BUN BLDV-MCNC: 14 MG/DL (ref 6–20)
CALCIUM SERPL-MCNC: 8.6 MG/DL (ref 8.6–10.2)
CHLORIDE BLD-SCNC: 98 MMOL/L (ref 98–107)
CO2: 23 MMOL/L (ref 22–29)
CREAT SERPL-MCNC: 0.8 MG/DL (ref 0.7–1.2)
EOSINOPHILS ABSOLUTE: 0 E9/L (ref 0.05–0.5)
EOSINOPHILS RELATIVE PERCENT: 0 % (ref 0–6)
GFR AFRICAN AMERICAN: >60
GFR NON-AFRICAN AMERICAN: >60 ML/MIN/1.73
GLUCOSE BLD-MCNC: 281 MG/DL (ref 74–99)
HCT VFR BLD CALC: 43.3 % (ref 37–54)
HEMOGLOBIN: 13.8 G/DL (ref 12.5–16.5)
LYMPHOCYTES ABSOLUTE: 0.69 E9/L (ref 1.5–4)
LYMPHOCYTES RELATIVE PERCENT: 15.9 % (ref 20–42)
MCH RBC QN AUTO: 25.7 PG (ref 26–35)
MCHC RBC AUTO-ENTMCNC: 31.9 % (ref 32–34.5)
MCV RBC AUTO: 80.8 FL (ref 80–99.9)
METER GLUCOSE: 247 MG/DL (ref 74–99)
METER GLUCOSE: 282 MG/DL (ref 74–99)
METER GLUCOSE: 344 MG/DL (ref 74–99)
MONOCYTES ABSOLUTE: 0.43 E9/L (ref 0.1–0.95)
MONOCYTES RELATIVE PERCENT: 9.7 % (ref 2–12)
NEUTROPHILS ABSOLUTE: 3.18 E9/L (ref 1.8–7.3)
NEUTROPHILS RELATIVE PERCENT: 74.4 % (ref 43–80)
NUCLEATED RED BLOOD CELLS: 0 /100 WBC
PDW BLD-RTO: 14.6 FL (ref 11.5–15)
PLATELET # BLD: 301 E9/L (ref 130–450)
PMV BLD AUTO: 10 FL (ref 7–12)
POTASSIUM REFLEX MAGNESIUM: 4.5 MMOL/L (ref 3.5–5)
RBC # BLD: 5.36 E12/L (ref 3.8–5.8)
RBC # BLD: NORMAL 10*6/UL
SODIUM BLD-SCNC: 133 MMOL/L (ref 132–146)
TOTAL PROTEIN: 6.7 G/DL (ref 6.4–8.3)
WBC # BLD: 4.3 E9/L (ref 4.5–11.5)

## 2021-11-09 PROCEDURE — 6360000002 HC RX W HCPCS: Performed by: PHYSICIAN ASSISTANT

## 2021-11-09 PROCEDURE — 82962 GLUCOSE BLOOD TEST: CPT

## 2021-11-09 PROCEDURE — 85025 COMPLETE CBC W/AUTO DIFF WBC: CPT

## 2021-11-09 PROCEDURE — 2580000003 HC RX 258: Performed by: INTERNAL MEDICINE

## 2021-11-09 PROCEDURE — 36415 COLL VENOUS BLD VENIPUNCTURE: CPT

## 2021-11-09 PROCEDURE — 6360000002 HC RX W HCPCS: Performed by: INTERNAL MEDICINE

## 2021-11-09 PROCEDURE — 80053 COMPREHEN METABOLIC PANEL: CPT

## 2021-11-09 PROCEDURE — 6370000000 HC RX 637 (ALT 250 FOR IP): Performed by: PHYSICIAN ASSISTANT

## 2021-11-09 PROCEDURE — 2700000000 HC OXYGEN THERAPY PER DAY

## 2021-11-09 PROCEDURE — 6370000000 HC RX 637 (ALT 250 FOR IP): Performed by: INTERNAL MEDICINE

## 2021-11-09 RX ORDER — PREDNISONE 20 MG/1
20 TABLET ORAL DAILY
Qty: 7 TABLET | Refills: 0 | Status: SHIPPED | OUTPATIENT
Start: 2021-11-09 | End: 2021-11-16

## 2021-11-09 RX ADMIN — ESCITALOPRAM 20 MG: 10 TABLET, FILM COATED ORAL at 09:27

## 2021-11-09 RX ADMIN — DEXAMETHASONE 6 MG: 1 TABLET ORAL at 09:27

## 2021-11-09 RX ADMIN — INSULIN LISPRO 12 UNITS: 100 INJECTION, SOLUTION INTRAVENOUS; SUBCUTANEOUS at 16:27

## 2021-11-09 RX ADMIN — Medication 10 ML: at 09:28

## 2021-11-09 RX ADMIN — BARICITINIB 4 MG: 2 TABLET, FILM COATED ORAL at 09:27

## 2021-11-09 RX ADMIN — ENOXAPARIN SODIUM 40 MG: 100 INJECTION SUBCUTANEOUS at 09:28

## 2021-11-09 RX ADMIN — METOPROLOL SUCCINATE 100 MG: 100 TABLET, EXTENDED RELEASE ORAL at 09:27

## 2021-11-09 RX ADMIN — INSULIN LISPRO 6 UNITS: 100 INJECTION, SOLUTION INTRAVENOUS; SUBCUTANEOUS at 11:36

## 2021-11-09 RX ADMIN — BUSPIRONE HYDROCHLORIDE 10 MG: 10 TABLET ORAL at 09:27

## 2021-11-09 RX ADMIN — INSULIN LISPRO 6 UNITS: 100 INJECTION, SOLUTION INTRAVENOUS; SUBCUTANEOUS at 06:36

## 2021-11-09 ASSESSMENT — PAIN SCALES - GENERAL
PAINLEVEL_OUTOF10: 0
PAINLEVEL_OUTOF10: 0

## 2021-11-09 NOTE — TELEPHONE ENCOUNTER
----- Message from Mary Jo Santoyo sent at 2021 12:04 PM EST -----  Subject: Appointment Request    Reason for Call: New Patient Request Appointment    QUESTIONS  Type of Appointment? New Patient/New to Provider  Reason for appointment request? No appointments available during search  Additional Information for Provider? patient wants to establish care with   Dr Christopher Malik, wife Indu Groves sees the doctor already. Patient is currently   in hospital with Covid pneumonia, please call pt's wife Indu Groves at   [phone]  ---------------------------------------------------------------------------  --------------  [address]  What is the best way for the office to contact you? OK to leave message on   voicemail  Preferred Call Back Phone Number? [phone]  ---------------------------------------------------------------------------  --------------  SCRIPT ANSWERS  Relationship to Patient? Other  Representative Name? Indu Groves wife  Additional information verified (besides Name and Date of Birth)? Address  Specialty Confirmation? Primary Care  Is this the first appointment to establish care for a ? No  Have you been diagnosed with, awaiting test results for, or told that you   are suspected of having COVID-19 (Coronavirus)? (If patient has tested   negative or was tested as a requirement for work, school, or travel and   not based on symptoms, answer no)? Yes  Did your symptoms begin within the past 14 days or was your positive test   result within the past 14 days?  Yes

## 2021-11-09 NOTE — PROGRESS NOTES
Physical Therapy    Facility/Department: [address] INTERMEDIATE 1  Initial Assessment    NAME: Nita Zapien  : 1985  MRN: [id number]    Date of Service: 2021    Order received for PT evaluation. Pt up independently in room. Denies any PT needs at this time.    Hemalatha Hillsems PT [id number]

## 2021-11-09 NOTE — PROGRESS NOTES
Pulse ox was 88% on room air at rest.   Ambulated patient on room air. Oxygen saturation was 84-85% on room air while ambulating. Oxygen applied. Recovery pulse ox was 90% on 4 liters of oxygen while ambulating.

## 2021-11-09 NOTE — PROGRESS NOTES
Spoke with Dr. Siri Cabrera regarding Micardis, orders given to send patient home on this medication.

## 2021-11-09 NOTE — PROGRESS NOTES
Occupational Therapy      Occupational Therapy referral received. Spoke with patient, reports he is getting himself in/out of bed, walking to/from bathroom, able to complete own self care   At this time, feel no need for OT services.  No further questions   OT order discontinued - patient agreed

## 2021-11-09 NOTE — CARE COORDINATION
Social Work/Discharge Planning:  Called [place] with Public Benefits and left a message to confirm if patient is eligible with medication assistance. Received notification from RN of need for oxygen. Called patient and reviewed affordable DME choices. Patient will use [employer]. Referral made to [facility] with [address]. Informed RN of need for oxygen order. Will continue to follow. Electronically signed by MICHAELLE Webster on 11/9/2021 at 3:44 PM    Addendum:  DME order noted for oxygen. Notified [facility] with [employer] of DME order.   Electronically signed by MICHAELLE Webster on 11/9/2021 at 4:14 PM English

## 2021-11-09 NOTE — DISCHARGE SUMMARY
Physician Discharge Summary     Patient ID:  Louisa Guerra  [id number]  39 y.o.  1985    Admit date: 11/6/2021    Discharge date and time: 11/9/2021    Admission Diagnoses:   Patient Active Problem List   Diagnosis    COVID    Pneumonia due to COVID-19 virus    Hyponatremia    Diabetes ([facility] [address]       Discharge Diagnoses:     Pneumonia due to COVID-19 virus  Acute hypoxic respiratory failure related to above.    Hyponatremia    Diabetes ([facility] [address]  Morbid obesity  Depression  Hypertension  Elevated liver function test  Hyperglycemia like related to steroid use    Consults: none    Procedures: None    Hospital Course: The patient is a 37 y. o. male of Radha Worley DO with significant past medical history of obesity, hypertension, depression, non-insulin-dependent diabetes mellitus type 2 who presents with progressive shortness of breath after being diagnosed with a COVID-19 infection on 11/3/2021. Dante use that time he was evaluated in the ER and sent home azithromycin and steroids. Ada Julien presented 3 days later on 11/6/2021 with worsening shortness of breath and the need for oxygen to maintain saturations above 90%.     On initial evaluation in the ER, he was noted to have low sodium at 127, elevated glucose of 243, mildly elevated troponin of 14, transaminitis with an elevation in his ALT to 84 and AST to 138.  Of note, prior to transfer to our facility, he was noted to have an elevated D-dimer however could not go through CT imaging prior to transfer.  At that time he was dosed with therapeutic dosing of Lovenox at 1 mg/kg in the emergency department until CT could have been completed.  This report is not visible and I am unsure if this CT was actually done.  During the admission he was also started on baricitinib, Decadron, full dose Lovenox. He did have elevated liver enzymes for which a right upper quadrant ultrasound was performed however reported hepatomegaly with fatty change about the liver.   On 11/8/2021 is oxygen need was down to about 3-4 L, O2 ambulatory test in the morning to see if he can potentially discharge. 11/9, continue decreasing oxygen requirements and finally noted to require only 4 L with ambulation and discharged appropriately with completion of steroid course. Recent Labs     11/07/21  0320 11/08/21  1320 11/09/21  0419   WBC 5.0 3.7* 4.3*   HGB 13.7 13.8 13.8   HCT 42.6 43.0 43.3    265 301       Recent Labs     11/07/21  0320 11/08/21  1320 11/09/21  0419   * 135 133   K 4.4 4.8 4.5   CL 88* 98 98   CO2 26 26 23   BUN 16 14 14   CREATININE 0.9 0.8 0.8   CALCIUM 8.8 8.6 8.6       XR CHEST (2 VW)    Result Date: 11/6/2021  EXAMINATION: TWO XRAY VIEWS OF THE CHEST 11/6/2021 12:54 am COMPARISON: None. HISTORY: ORDERING SYSTEM PROVIDED HISTORY: SOB FINDINGS: Patchy areas of consolidation scattered in the lungs, right greater than left. Heart size is not enlarged. No pleural effusion or pneumothorax. Patchy areas of consolidation scattered in the lungs, right greater than left. Discharge Exam:    General Appearance:  awake, alert, oriented, in no acute distress  Head/face:  NCAT  Eyes:  No gross abnormalities. Lungs: Adequate air entry bilaterally, decreased as bibasilar, bilateral rhonchus sounds noted and some mild crackles scattered. Heart:  Heart sounds are normal.  Regular rate and rhythm without murmur, gallop or rub. Abdomen: Obese, soft, nontender, nondistended, no rebound, no guarding.  Positive bowel sounds. Extremities: +1 - trace edema bilaterally.   Neurologic: No focal neurological deficits noted    Disposition: home     [address]    Patient Instructions:      Medication List      CHANGE how you take these medications    predniSONE 20 MG tablet  Commonly known as: DELTASONE  Take 1 tablet by mouth daily for 7 days  What changed: how much to take        CONTINUE taking these medications    benzonatate 100 MG capsule  Commonly known as: TESSALON  Take 1 capsule by mouth 3 times daily as needed for Cough     busPIRone 10 MG tablet  Commonly known as: BUSPAR     escitalopram 20 MG tablet  Commonly known as: LEXAPRO     hydroCHLOROthiazide 25 MG tablet  Commonly known as: HYDRODIURIL     metFORMIN 500 MG extended release tablet  Commonly known as: GLUCOPHAGE-XR     metoprolol succinate 100 MG extended release tablet  Commonly known as: TOPROL XL     sildenafil 20 MG tablet  Commonly known as: REVATIO        STOP taking these medications    azithromycin 250 MG tablet  Commonly known as: ZITHROMAX        ASK your doctor about these medications    telmisartan 80 MG tablet  Commonly known as: MICARDIS           Where to Get Your Medications      [facility] [address] [facility] [address] [place]  [address] [address]    Phone: [phone]   · predniSONE 20 MG tablet       Activity: activity as tolerated  Diet: diabetic diet    Pt has been advised to: Follow-up with Lane Villegas DO in 1 week.   Follow-up with consultants as recommended by them    Note that over 30 minutes was spent in preparing discharge papers, discussing discharge with patient, medication review, etc.    Signed:  Az Garcia MD  11/9/2021  4:16 PM

## 2021-11-09 NOTE — TELEPHONE ENCOUNTER
Pt is requesting to establish w/you. His wife Juan Luis Sagastume is a pt of yours. Accept as new pt?

## 2021-11-10 ENCOUNTER — CARE COORDINATION (OUTPATIENT)
Dept: CASE MANAGEMENT | Age: 36
End: 2021-11-10

## 2021-11-10 LAB — METER GLUCOSE: 229 MG/DL (ref 74–99)

## 2021-11-10 RX ORDER — LORATADINE 10 MG/1
10 TABLET ORAL DAILY
COMMUNITY

## 2021-11-10 NOTE — CARE COORDINATION
[facility] Transitions Initial Follow Up Call    Call within 2 business days of discharge: Yes    Patient: Jone Ruvalcaba Patient : 1985   MRN: [id number]  Reason for Admission: There are no discharge diagnoses documented for the most recent discharge. Discharge Date: 21 RARS: Readmission Risk Score: 9.4 ( )      Last Discharge [facility]       Complaint Diagnosis Description Type Department Provider    21   Admission (Discharged) KRYSTLE Simmons MD    21 Positive For Covid-19 Acute respiratory failure with hypoxia ([address] . .. ED (TRANSFER) [facility] María Vazquez DO        Patient contacted regarding COVID-19 diagnosis. Discussed COVID-19 related testing which was available at this time. Test results were positive. Patient informed of results, if available? Yes.~Pt aware of previously +COVID-19 results (+21)      Care Transition Nurse contacted the patient by telephone to perform post discharge assessment. Call within 2 business days of discharge: Yes. Provided introduction to self, and explanation of the CTN/ACM role, and reason for call due to risk factors for infection and/or exposure to COVID-19. Symptoms reviewed with patient who verbalized the following symptoms: fatigue, cough, no new symptoms, no worsening symptoms and mild wheezing and chest congestion. Pt reports he is doing \"good\" today. Pt discharged from [facility] on 21 with COVID pneumonia (+21). Pt was discharged home with O2@ 4L ([employer]). Confirmed pt is wearing at 4L. He states that [employer] brought him 4 portable tanks and a home concentrator unit. Pt's SaO2 ranges ranging between 92-94% on 4L. Pt denies any sob. Pt does report to mild wheezing, chest congestion, and cough, but states that this is improving. CTN reviewed medications in full. Pt will be establishing with new pcp on 11/15/21. Pt had no current needs/concerns. Pt agreeable to ongoing outreaches from CTN.        Due to no new or worsening symptoms encounter was not routed to provider for escalation. Discussed follow-up appointments. If no appointment was previously scheduled, appointment scheduling offered: No.~pt already scheduled an appt to establish with new pcp/HFU on 11/15/21    [facility] follow up appointment(s):   Future Appointments   Date Time Provider Kari Arambula   11/15/2021  3:00 PM DO RUTH Parnell [facility]       Non-face-to-face services provided:  Scheduled appointment with PCP-Dr. Rashmi Hastings pt appt/HFU 11/15/21  Obtained and reviewed discharge summary and/or continuity of care documents  Assessment and support for treatment adherence and medication management-Full med review completed. [id number] entered     Advance Care Planning:   Does patient have an Advance Directive:  health care decision maker information reviewed and verified. .     Educated patient about risk for severe COVID-19 due to risk factors according to CDC guidelines. CTN reviewed discharge instructions, medical action plan and red flag symptoms with the patient who verbalized understanding. Discussed exposure protocols and quarantine with CDC Guidelines. Patient was given an opportunity to verbalize any questions and concerns and agrees to contact CTN or health care provider for questions related to their healthcare. Reviewed and educated patient on any new and changed medications related to discharge diagnosis     Was patient discharged with a pulse oximeter? Yes. Discussed and confirmed pulse oximeter discharge instructions and when to notify provider or seek emergency care, as per COVID zones. CTN provided contact information. Plan for follow-up call in 5-7 days based on severity of symptoms and risk factors.

## 2021-11-10 NOTE — PROGRESS NOTES
Physician Progress Note      Paddy Stewart  [facility] #:                  [id number]  :                       1985  ADMIT DATE:       2021 10:39 AM  DISCH DATE:        2021 7:53 PM  RESPONDING  PROVIDER #:        Rosmery Wilkins          QUERY TEXT:    Patient admitted with shortness of breath. Noted documentation of acute   respiratory failure in H&P and IM progress notes. In order to support the   diagnosis of acute respiratory failure, please include additional clinical   indicators in your documentation. Or please document if the diagnosis of acute   respiratory failure has been ruled out after further study. The medical record reflects the following:  ? Risk Factors: +COVID pneumonia  ? Clinical Indicators: resp 20-24, 91% 6L and weaning as tolerated, unlabored   breathing and dyspnea with exertion only per nursing, per IM \". Shahriar Ilan Shahriar Ilan Pneumonia due   to COVID-19 virus. Shahriar Ilan Shahriar Ilan Acute hypoxic respiratory failure related to above. Shahriar Ilan Shahriar Ilan \"  ? Treatment: O2 therapy    Acute Respiratory Failure Clinical Indicators per [employer] Training Guide and   Quick Reference Guide:  pO2 < 60 mmHg  pCO2 > 50 and pH < 7.35  P/F ratio (pO2 / FIO2) < 300  pO2 decrease or pCO2 increase by 10 mmHg from baseline (if known)  Supplemental oxygen of 40% or more  Presence of respiratory distress, wheezing  Unable to speak in complete sentences  Use of accessory muscles to breathe  Extreme anxiety and feeling of impending doom  Tripod position  Confusion/altered mental status/obtunded    Thank you,  Radha Dunn, RN, BSN, CDIS  Clinical Documentation Improvement  [email or web address]  Options provided:  -- Acute Respiratory Failure as evidenced by, Please document evidence.   -- Acute Respiratory Failure ruled out after study  -- Other - I will add my own diagnosis  -- Disagree - Not applicable / Not valid  -- Disagree - Clinically unable to determine / Unknown  -- Refer to Clinical Documentation Reviewer    PROVIDER RESPONSE TEXT:    This patient is in acute respiratory failure as evidenced by Requiring O2 and   being discharged on it, not on Home O2    Query created by: Elliott Bell, April on 11/9/2021 8:53 AM      Electronically signed by:  Laila Varghese 11/10/2021 12:06 PM

## 2021-11-15 ENCOUNTER — OFFICE VISIT (OUTPATIENT)
Dept: PRIMARY CARE CLINIC | Age: 36
End: 2021-11-15
Payer: OTHER GOVERNMENT

## 2021-11-15 VITALS
OXYGEN SATURATION: 95 % | HEART RATE: 110 BPM | BODY MASS INDEX: 39.17 KG/M2 | SYSTOLIC BLOOD PRESSURE: 120 MMHG | DIASTOLIC BLOOD PRESSURE: 72 MMHG | HEIGHT: 75 IN | WEIGHT: 315 LBS

## 2021-11-15 DIAGNOSIS — I10 PRIMARY HYPERTENSION: ICD-10-CM

## 2021-11-15 DIAGNOSIS — U07.1 COVID: ICD-10-CM

## 2021-11-15 DIAGNOSIS — J12.82 PNEUMONIA DUE TO COVID-19 VIRUS: Primary | ICD-10-CM

## 2021-11-15 DIAGNOSIS — U07.1 PNEUMONIA DUE TO COVID-19 VIRUS: Primary | ICD-10-CM

## 2021-11-15 DIAGNOSIS — E11.9 TYPE 2 DIABETES MELLITUS WITHOUT COMPLICATION, WITHOUT LONG-TERM CURRENT USE OF INSULIN (HCC): ICD-10-CM

## 2021-11-15 PROCEDURE — 3052F HG A1C>EQUAL 8.0%<EQUAL 9.0%: CPT | Performed by: FAMILY MEDICINE

## 2021-11-15 PROCEDURE — 99214 OFFICE O/P EST MOD 30 MIN: CPT | Performed by: FAMILY MEDICINE

## 2021-11-15 RX ORDER — OMEPRAZOLE 40 MG/1
CAPSULE, DELAYED RELEASE ORAL
COMMUNITY
Start: 2021-09-02

## 2021-11-15 ASSESSMENT — ENCOUNTER SYMPTOMS
GASTROINTESTINAL NEGATIVE: 1
ALLERGIC/IMMUNOLOGIC NEGATIVE: 1
RESPIRATORY NEGATIVE: 1
EYES NEGATIVE: 1

## 2021-11-15 ASSESSMENT — PATIENT HEALTH QUESTIONNAIRE - PHQ9
SUM OF ALL RESPONSES TO PHQ QUESTIONS 1-9: 2
2. FEELING DOWN, DEPRESSED OR HOPELESS: 1
SUM OF ALL RESPONSES TO PHQ QUESTIONS 1-9: 2
1. LITTLE INTEREST OR PLEASURE IN DOING THINGS: 1
SUM OF ALL RESPONSES TO PHQ9 QUESTIONS 1 & 2: 2
SUM OF ALL RESPONSES TO PHQ QUESTIONS 1-9: 2

## 2021-11-15 NOTE — PROGRESS NOTES
improved. Review of Systems   Constitutional: Negative. HENT: Negative. Eyes: Negative. Respiratory: Negative. Gastrointestinal: Negative. Endocrine: Negative. Genitourinary: Negative. Musculoskeletal: Negative. Skin: Negative. Allergic/Immunologic: Negative. Neurological: Negative. Hematological: Negative. Psychiatric/Behavioral: Negative. Current Outpatient Medications:     omeprazole (PRILOSEC) 40 MG delayed release capsule, TAKE 1 CAPSULE BY MOUTH EVERY DAY, Disp: , Rfl:     loratadine (CLARITIN) 10 MG tablet, Take 10 mg by mouth daily, Disp: , Rfl:     predniSONE (DELTASONE) 20 MG tablet, Take 1 tablet by mouth daily for 7 days, Disp: 7 tablet, Rfl: 0    escitalopram (LEXAPRO) 20 MG tablet, escitalopram 20 mg tablet  TAKE 1 TABLET BY MOUTH EVERY DAY, Disp: , Rfl:     busPIRone (BUSPAR) 10 MG tablet, TAKE 1 TABLET BY MOUTH TWICE DAILY AS NEEDED, Disp: , Rfl:     metoprolol succinate (TOPROL XL) 100 MG extended release tablet, TAKE 1 TABLET BY MOUTH EVERY DAY, Disp: , Rfl:     hydroCHLOROthiazide (HYDRODIURIL) 25 MG tablet, TAKE 1 TABLET BY MOUTH EVERY DAY, Disp: , Rfl:     telmisartan (MICARDIS) 80 MG tablet, TAKE 1 TABLET BY MOUTH EVERY DAY, Disp: , Rfl:     metFORMIN (GLUCOPHAGE-XR) 500 MG extended release tablet, 1 po bid, Disp: , Rfl:     sildenafil (REVATIO) 20 MG tablet, TAKE 1 TABLET BY MOUTH FIVE TIMES DAILY AS NEEDED, Disp: , Rfl:     Allergies   Allergen Reactions    Sulfa Antibiotics Anaphylaxis       Social History     Tobacco Use    Smoking status: Former Smoker    Smokeless tobacco: Current User   Substance Use Topics    Alcohol use: No     Comment: occ    Drug use: No      Past Surgical History:   Procedure Laterality Date    MYRINGOTOMY      x 5     No family history on file.   Past Medical History:   Diagnosis Date    Hypertension        Vitals:    11/15/21 1501   BP: 120/72   Pulse: 110   SpO2: 95%   Weight: (!) 456 lb (206.8 kg)   Height: 6' 3\" (1.905 m)     BP Readings from Last 3 Encounters:   11/15/21 120/72   11/09/21 (!) 146/90   11/06/21 107/73        Physical Exam  Vitals and nursing note reviewed. Constitutional:       Appearance: He is well-developed. HENT:      Head: Normocephalic. Right Ear: External ear normal.      Left Ear: External ear normal.      Nose: Nose normal.   Eyes:      Conjunctiva/sclera: Conjunctivae normal.      Pupils: Pupils are equal, round, and reactive to light. Cardiovascular:      Rate and Rhythm: Normal rate. Pulmonary:      Breath sounds: Normal breath sounds. Abdominal:      General: Bowel sounds are normal.      Palpations: Abdomen is soft. Musculoskeletal:         General: Normal range of motion. Cervical back: Normal range of motion and neck supple. Skin:     General: Skin is warm and dry. Neurological:      Mental Status: He is alert and oriented to person, place, and time. Psychiatric:         Behavior: Behavior normal.     Today's vitals physical examination all stable. Medications to continue as prescribed. Labs will be completed and follow-up in 4 weeks if he chooses to do so. Notify me if any problems coming off oxygen and if any respiratory difficulties. Plan Per Assessment:  Syed Guerra was seen today for established new doctor. Diagnoses and all orders for this visit:    Pneumonia due to COVID-19 virus  -     CBC Auto Differential; Future  -     Comprehensive Metabolic Panel; Future  -     Hemoglobin A1C; Future  -     Lipid Panel; Future  -     T4; Future  -     TSH without Reflex; Future    Primary hypertension  -     CBC Auto Differential; Future  -     Comprehensive Metabolic Panel; Future  -     Hemoglobin A1C; Future  -     Lipid Panel; Future  -     T4; Future  -     TSH without Reflex;  Future    Type 2 diabetes mellitus without complication, without long-term current use of insulin (HCC)  -     CBC Auto Differential; Future  - Comprehensive Metabolic Panel; Future  -     Hemoglobin A1C; Future  -     Lipid Panel; Future  -     T4; Future  -     TSH without Reflex; Future    COVID            No follow-ups on file. Camron Crenshaw DO    Note was generated with the assistance of voice recognition software. Document was reviewed however may contain grammatical errors.

## 2021-11-22 ENCOUNTER — CARE COORDINATION (OUTPATIENT)
Dept: CASE MANAGEMENT | Age: 36
End: 2021-11-22

## 2021-11-22 NOTE — CARE COORDINATION
[facility] Transitions Follow Up Call    2021    Patient: Elena León  Patient : 1985   MRN: [id number]  Reason for Admission: There are no discharge diagnoses documented for the most recent discharge. Discharge Date: 21 RARS: Readmission Risk Score: 9.4 ( )       Patient resolved from the Care Transitions episode on 21               Patient currently reports that the following symptoms have improved:  cough and no new/worsening symptoms. Pt discharged from [facility] on 21 with COVID-19 pna (+21). Pt discharged home with O2 at 4L [place] ([employer]). Pt reports that he is doing \"good\" today. Pt denies any sob, chest congestion, or chest tightness. Pt reports to mild wheezing \"from time to time\" and cough with mucus production. Pt reports that he has been off of his O2 for 4-5 days now and SaO2 readings have remained >92% on RA. Pt did f/u and established with Dr. Haven Robertson on 11/15/21, but pt states that he has decided to stay with his previous physician, Dr. Debora La. Pt states that he also had a f/u with Dr. Debora La since discharge. Pt states that he has a f/u scheduled with Dr. Debora La next month. Pt voiced no needs/concerns. Pt agreeable with today being the final call. CTN will sign off. No further outreach scheduled with this CTN. Episode of Care resolved. Patient has this CTN contact information if future needs arise.

## 2022-02-09 ENCOUNTER — HOSPITAL ENCOUNTER (OUTPATIENT)
Age: 37
Discharge: HOME | End: 2022-02-09
Payer: COMMERCIAL

## 2022-02-09 LAB
HCT VFR BLD CALC: 41.8 % (ref 37–54)
HEMOGLOBIN: 13.9 G/DL (ref 12.5–16.5)
TESTOSTERONE TOTAL: 139.7 NG/DL

## 2022-02-09 PROCEDURE — 84403 ASSAY OF TOTAL TESTOSTERONE: CPT

## 2022-02-09 PROCEDURE — 36415 COLL VENOUS BLD VENIPUNCTURE: CPT

## 2022-02-09 PROCEDURE — 85014 HEMATOCRIT: CPT

## 2022-02-09 PROCEDURE — 85018 HEMOGLOBIN: CPT

## 2023-02-13 ENCOUNTER — HOSPITAL ENCOUNTER (EMERGENCY)
Age: 38
Discharge: HOME | End: 2023-02-13
Payer: COMMERCIAL

## 2023-02-13 VITALS
OXYGEN SATURATION: 96 % | WEIGHT: 315 LBS | HEART RATE: 85 BPM | TEMPERATURE: 97.8 F | BODY MASS INDEX: 57 KG/M2 | RESPIRATION RATE: 16 BRPM | DIASTOLIC BLOOD PRESSURE: 116 MMHG | SYSTOLIC BLOOD PRESSURE: 184 MMHG

## 2023-02-13 DIAGNOSIS — S61.411A LACERATION OF RIGHT PALM, INITIAL ENCOUNTER: Primary | ICD-10-CM

## 2023-02-13 PROCEDURE — 90715 TDAP VACCINE 7 YRS/> IM: CPT | Performed by: NURSE PRACTITIONER

## 2023-02-13 PROCEDURE — 90471 IMMUNIZATION ADMIN: CPT | Performed by: NURSE PRACTITIONER

## 2023-02-13 PROCEDURE — 2500000003 HC RX 250 WO HCPCS: Performed by: NURSE PRACTITIONER

## 2023-02-13 PROCEDURE — 12002 RPR S/N/AX/GEN/TRNK2.6-7.5CM: CPT

## 2023-02-13 PROCEDURE — 99284 EMERGENCY DEPT VISIT MOD MDM: CPT

## 2023-02-13 PROCEDURE — 6360000002 HC RX W HCPCS: Performed by: NURSE PRACTITIONER

## 2023-02-13 PROCEDURE — 6370000000 HC RX 637 (ALT 250 FOR IP): Performed by: NURSE PRACTITIONER

## 2023-02-13 PROCEDURE — 6370000000 HC RX 637 (ALT 250 FOR IP)

## 2023-02-13 RX ORDER — BACITRACIN ZINC 500 [USP'U]/G
OINTMENT TOPICAL
Status: COMPLETED
Start: 2023-02-13 | End: 2023-02-13

## 2023-02-13 RX ORDER — LIDOCAINE HYDROCHLORIDE 10 MG/ML
5 INJECTION, SOLUTION INFILTRATION; PERINEURAL ONCE
Status: COMPLETED | OUTPATIENT
Start: 2023-02-13 | End: 2023-02-13

## 2023-02-13 RX ORDER — BACITRACIN ZINC 500 [USP'U]/G
OINTMENT TOPICAL ONCE
Status: COMPLETED | OUTPATIENT
Start: 2023-02-13 | End: 2023-02-13

## 2023-02-13 RX ORDER — HYDROCODONE BITARTRATE AND ACETAMINOPHEN 5; 325 MG/1; MG/1
1 TABLET ORAL ONCE
Status: COMPLETED | OUTPATIENT
Start: 2023-02-13 | End: 2023-02-13

## 2023-02-13 RX ADMIN — BACITRACIN ZINC 450 UNITS: 500 OINTMENT TOPICAL at 23:10

## 2023-02-13 RX ADMIN — LIDOCAINE HYDROCHLORIDE 5 ML: 10 INJECTION, SOLUTION INFILTRATION; PERINEURAL at 21:55

## 2023-02-13 RX ADMIN — TETANUS TOXOID, REDUCED DIPHTHERIA TOXOID AND ACELLULAR PERTUSSIS VACCINE, ADSORBED 0.5 ML: 5; 2.5; 8; 8; 2.5 SUSPENSION INTRAMUSCULAR at 21:55

## 2023-02-13 RX ADMIN — HYDROCODONE BITARTRATE AND ACETAMINOPHEN 1 TABLET: 5; 325 TABLET ORAL at 21:54

## 2023-02-13 ASSESSMENT — PAIN DESCRIPTION - LOCATION
LOCATION: HAND
LOCATION: HAND

## 2023-02-13 ASSESSMENT — PAIN - FUNCTIONAL ASSESSMENT
PAIN_FUNCTIONAL_ASSESSMENT: NONE - DENIES PAIN
PAIN_FUNCTIONAL_ASSESSMENT: 0-10
PAIN_FUNCTIONAL_ASSESSMENT: PREVENTS OR INTERFERES SOME ACTIVE ACTIVITIES AND ADLS

## 2023-02-13 ASSESSMENT — LIFESTYLE VARIABLES
HOW OFTEN DO YOU HAVE A DRINK CONTAINING ALCOHOL: NEVER
HOW MANY STANDARD DRINKS CONTAINING ALCOHOL DO YOU HAVE ON A TYPICAL DAY: PATIENT DOES NOT DRINK

## 2023-02-13 ASSESSMENT — PAIN SCALES - GENERAL
PAINLEVEL_OUTOF10: 10
PAINLEVEL_OUTOF10: 10

## 2023-02-13 ASSESSMENT — PAIN DESCRIPTION - ORIENTATION
ORIENTATION: RIGHT
ORIENTATION: RIGHT

## 2023-02-13 ASSESSMENT — PAIN DESCRIPTION - PAIN TYPE: TYPE: ACUTE PAIN

## 2023-02-13 ASSESSMENT — PAIN DESCRIPTION - DESCRIPTORS
DESCRIPTORS: THROBBING;BURNING
DESCRIPTORS: DISCOMFORT;SORE

## 2023-02-13 ASSESSMENT — PAIN DESCRIPTION - FREQUENCY: FREQUENCY: CONTINUOUS

## 2023-02-14 NOTE — DISCHARGE INSTRUCTIONS
Starting tomorrow, wash the area gently with mild soap and water, pat dry, apply Neosporin and a Band-Aid, do this twice daily over the next couple of days. If it appears to be healing well you may leave open to air, continue to wash gently twice daily with mild soap and water. Do not soak the stitches for long periods of time and water. If you develop any redness, swelling, drainage, fevers please return to the ER.   Otherwise follow-up with PCP as planned for suture removal.

## 2023-02-14 NOTE — ED NOTES
Wound care with bacitracin and secured with gauze performed at right hand. Pt given discharge summary with education on laceration at right palm with wound care  Pt informed to follow up with his primary care physician.      David Queen RN  02/13/23 3959

## 2023-02-15 NOTE — ED PROVIDER NOTES
Independent [facility] Visit. Department of Emergency Medicine  ED Provider Note  Admit Date: 2/13/2023  Room: HOLLIS/HOLLIS            HPI:  2/15/23, Time: 6:13 AM KRISTOFER Gross is a 44 y.o. old male presenting to the emergency department for a laceration to the right palm over the thenar eminence that occurred approximately 1 hour prior to arrival.  Patient reports that he had an automatic knife in his right pocket and reached into the pocket and the knife opened lacerating his hand. He reports the knife was clean. His tetanus is not up-to-date. This was not work-related. He did experience pain which is a 10 out of 10, was able to come trolled bleeding with manual direct pressure. He denies any gross contamination of the wound. Review of Systems:   Pertinent positives and negatives are stated within HPI, all other systems reviewed and are negative.          --------------------------------------------- PAST HISTORY ---------------------------------------------  Past Medical History:  has a past medical history of Hypertension. Past Surgical History:  has a past surgical history that includes myringotomy. Social History:  reports that he has quit smoking. He uses smokeless tobacco. He reports that he does not drink alcohol and does not use drugs. Family History: family history is not on file. The patients home medications have been reviewed. Allergies: Sulfa antibiotics    -------------------------------------------------- RESULTS -------------------------------------------------  All laboratory and radiology results have been personally reviewed by myself   LABS:  No results found for this visit on 02/13/23. RADIOLOGY:  Interpreted by Radiologist.  No orders to display       ------------------------- NURSING NOTES AND VITALS REVIEWED ---------------------------   The nursing notes within the ED encounter and vital signs as below have been reviewed.    BP (!) 184/116 Comment: pt states he did not take any of his bp meds today, MD cleared pt for d/c  Pulse 85   Temp 97.8 °F (36.6 °C)   Resp 16   Wt (!) 456 lb (206.8 kg)   SpO2 96%   BMI 57.00 kg/m²   Oxygen Saturation Interpretation: Normal      ---------------------------------------------------PHYSICAL EXAM--------------------------------------      Constitutional/General: Alert and oriented x3, well appearing, non toxic in NAD  Head: Normocephalic and atraumatic  Eyes: PERRL, EOMI  Mouth: Oropharynx clear, handling secretions, no trismus  Neck: Supple, full ROM,   Pulmonary: Lungs clear to auscultation bilaterally, no wheezes, rales, or rhonchi. Not in respiratory distress  Cardiovascular:  Regular rate and rhythm, no murmurs, gallops, or rubs. 2+ distal pulses  Abdomen: Soft, non tender, non distended,   Extremities: Moves all extremities x 4. Warm and well perfused  Skin: warm and dry without rash. There is a laceration of the volar aspect of the right hand over the thenar eminence, which measures 4cm. It is a full thickness laceration. There is no evidence of foreign body or gross contamination. Neurologic: GCS 15,  Psych: Normal Affect      ------------------------------ ED COURSE/MEDICAL DECISION MAKING----------------------  Medications   HYDROcodone-acetaminophen (NORCO) 5-325 MG per tablet 1 tablet (1 tablet Oral Given 2/13/23 2154)   Tetanus-Diphth-Acell Pertussis (BOOSTRIX) injection 0.5 mL (0.5 mLs IntraMUSCular Given 2/13/23 2155)   lidocaine 1 % injection 5 mL (5 mLs IntraDERmal Given by Other 2/13/23 2155)   bacitracin zinc ointment (450 Units Topical Given 2/13/23 2310)             LACERATION REPAIR  PROCEDURE NOTE:  Unless otherwise indicated, this procedure was performed by myself      Laceration #: 1. Location: Right volar aspect of the hand over the thenar eminence  Length: 4 cm. The wound area was cleansed with povidone iodine and draped in a sterile fashion.   The wound area was anesthetized with Lidocaine 1% without epinephrine. WOUND COMPLEXITY:    Debridement: None. Undermining: None. Wound Margins Revised: None. Flaps Aligned: yes. The wound was explored with the following results no foreign body or tendon injury seen. The wound was closed with 5-0 Ethilon using interrupted sutures. Dressing:  bacitracin and a sterile dressing was placed. Total number suture: 6            Medical Decision Making: This is a 42-year-old male who presents with a laceration to the palm of the right hand that occurred after he was reaching into his pocket and his automatic knife opened lacerating his right hand. He has no motor function deficit, bleeding was easily controlled, no obvious gross contamination or foreign bodies. The laceration was repaired as above, patient's tetanus was updated, he was provided pain medication. Patient was advised on local wound care, advised to have sutures removed in 7 to 10 days, states that he has an appointment on 2/20/2023 with his PCP and will have them removed at that time. He was advised to return for any redness, swelling, drainage, fevers, lymphangitis. Patient was explicitly instructed on specific signs and symptoms on which to return to the emergency room for. Patient was instructed to return to the ER for any new or worsening symptoms. Additional discharge instructions were given verbally. All questions were answered. Patient is comfortable and agreeable with discharge plan. Patient in no acute distress and non-toxic in appearance. Counseling: The emergency provider has spoken with the patient and discussed todays results, in addition to providing specific details for the plan of care and counseling regarding the diagnosis and prognosis. Questions are answered at this time and they are agreeable with the plan.      --------------------------------- IMPRESSION AND DISPOSITION ---------------------------------    IMPRESSION  1.  Laceration of right palm, initial encounter        DISPOSITION  Disposition: Discharge to home  Patient condition is good          DARIA Matthews - CNP  02/15/23 6520

## 2023-03-23 LAB — ESTRADIOL SERPL-SCNC: 57.5 PG/ML

## 2025-01-17 ENCOUNTER — TELEPHONE (OUTPATIENT)
Dept: PRIMARY CARE CLINIC | Age: 40
End: 2025-01-17

## 2025-01-17 NOTE — TELEPHONE ENCOUNTER
----- Message from Keith ALVA sent at 1/10/2025  1:40 PM EST -----  Regarding: [facility] Appointment Request  [facility] Appointment Request    Patient needs appointment for [facility] Appointment Type: New to Provider.    Patient Requested Dates(s): As soon as possible  Patient Requested Time: Morning  Provider Name: Godfrey Shelton DO    Reason for Appointment Request: Established Patient - No appointments available during search  --------------------------------------------------------------------------------------------------------------------------    Relationship to Patient: Self     Call Back Information: OK to leave message on voicemail  Preferred Call Back Number: Phone [phone]

## 2025-02-14 ENCOUNTER — OFFICE VISIT (OUTPATIENT)
Dept: PRIMARY CARE CLINIC | Age: 40
End: 2025-02-14
Payer: COMMERCIAL

## 2025-02-14 VITALS
DIASTOLIC BLOOD PRESSURE: 80 MMHG | BODY MASS INDEX: 39.17 KG/M2 | HEART RATE: 78 BPM | OXYGEN SATURATION: 96 % | WEIGHT: 315 LBS | SYSTOLIC BLOOD PRESSURE: 130 MMHG | HEIGHT: 75 IN | TEMPERATURE: 97.8 F

## 2025-02-14 DIAGNOSIS — E11.9 TYPE 2 DIABETES MELLITUS WITHOUT COMPLICATION, WITHOUT LONG-TERM CURRENT USE OF INSULIN (HCC): Primary | ICD-10-CM

## 2025-02-14 DIAGNOSIS — E29.1 HYPOTESTOSTERONEMIA IN MALE: ICD-10-CM

## 2025-02-14 DIAGNOSIS — L08.9 SKIN INFLAMMATION: ICD-10-CM

## 2025-02-14 DIAGNOSIS — I10 PRIMARY HYPERTENSION: ICD-10-CM

## 2025-02-14 DIAGNOSIS — G47.30 SLEEP APNEA, UNSPECIFIED TYPE: ICD-10-CM

## 2025-02-14 PROCEDURE — 99204 OFFICE O/P NEW MOD 45 MIN: CPT | Performed by: FAMILY MEDICINE

## 2025-02-14 PROCEDURE — 3075F SYST BP GE 130 - 139MM HG: CPT | Performed by: FAMILY MEDICINE

## 2025-02-14 PROCEDURE — 3079F DIAST BP 80-89 MM HG: CPT | Performed by: FAMILY MEDICINE

## 2025-02-14 RX ORDER — DOXYCYCLINE 100 MG/1
100 CAPSULE ORAL 2 TIMES DAILY
Qty: 20 CAPSULE | Refills: 2 | Status: SHIPPED | OUTPATIENT
Start: 2025-02-14 | End: 2025-03-16

## 2025-02-14 RX ORDER — FUROSEMIDE 20 MG/1
20 TABLET ORAL DAILY
COMMUNITY

## 2025-02-14 RX ORDER — FLUTICASONE PROPIONATE 50 MCG
1 SPRAY, SUSPENSION (ML) NASAL DAILY PRN
COMMUNITY

## 2025-02-14 RX ORDER — TESTOSTERONE CYPIONATE 200 MG/ML
100 INJECTION, SOLUTION INTRAMUSCULAR WEEKLY
COMMUNITY
Start: 2025-02-01

## 2025-02-14 RX ORDER — METOPROLOL SUCCINATE 100 MG/1
100 TABLET, EXTENDED RELEASE ORAL DAILY
Qty: 30 TABLET | Refills: 3 | Status: SHIPPED | OUTPATIENT
Start: 2025-02-14

## 2025-02-14 RX ORDER — ORAL SEMAGLUTIDE 14 MG/1
14 TABLET ORAL
COMMUNITY

## 2025-02-14 ASSESSMENT — PATIENT HEALTH QUESTIONNAIRE - PHQ9
SUM OF ALL RESPONSES TO PHQ QUESTIONS 1-9: 0
SUM OF ALL RESPONSES TO PHQ QUESTIONS 1-9: 0
SUM OF ALL RESPONSES TO PHQ9 QUESTIONS 1 & 2: 0
SUM OF ALL RESPONSES TO PHQ QUESTIONS 1-9: 0
1. LITTLE INTEREST OR PLEASURE IN DOING THINGS: NOT AT ALL
SUM OF ALL RESPONSES TO PHQ QUESTIONS 1-9: 0
2. FEELING DOWN, DEPRESSED OR HOPELESS: NOT AT ALL

## 2025-02-14 ASSESSMENT — ENCOUNTER SYMPTOMS
ALLERGIC/IMMUNOLOGIC NEGATIVE: 1
RESPIRATORY NEGATIVE: 1
EYES NEGATIVE: 1
GASTROINTESTINAL NEGATIVE: 1

## 2025-02-14 NOTE — PROGRESS NOTES
25     Marcel Cornell    : 1985 Sex: male   Age: 39 y.o.      Chief Complaint   Patient presents with    New Patient       Prior to Admission medications    Medication Sig Start Date End Date Taking? Authorizing Provider   testosterone cypionate (DEPOTESTOTERONE CYPIONATE) 200 MG/ML injection Inject 0.5 mLs into the muscle once a week. 25  Yes Larry Peralta MD   furosemide (LASIX) 20 MG tablet Take 1 tablet by mouth daily   Yes Larry Peralta MD   fluticasone (FLONASE) 50 MCG/ACT nasal spray 1 spray by Each Nostril route daily as needed for Rhinitis   Yes Larry Peralta MD   metoprolol succinate (TOPROL XL) 100 MG extended release tablet Take 1 tablet by mouth daily 25  Yes Godfrey Shelton DO   Semaglutide (RYBELSUS) 14 MG TABS Take 14 mg by mouth   Yes Larry Peralta MD   doxycycline hyclate (VIBRAMYCIN) 100 MG capsule Take 1 capsule by mouth 2 times daily 2/14/25 3/16/25 Yes Godfrey Shelton DO   omeprazole (PRILOSEC) 40 MG delayed release capsule TAKE 1 CAPSULE BY MOUTH EVERY DAY 21  Yes Larry Peralta MD   loratadine (CLARITIN) 10 MG tablet Take 1 tablet by mouth daily   Yes Larry Peralta MD   escitalopram (LEXAPRO) 20 MG tablet escitalopram 20 mg tablet   TAKE 1 TABLET BY MOUTH EVERY DAY   Yes Larry Peralta MD   busPIRone (BUSPAR) 10 MG tablet TAKE 1 TABLET BY MOUTH TWICE DAILY AS NEEDED 10/20/21  Yes Larry Peralta MD   telmisartan (MICARDIS) 80 MG tablet TAKE 1 TABLET BY MOUTH EVERY DAY 9/3/21  Yes Larry Peralta MD   metFORMIN (GLUCOPHAGE-XR) 500 MG extended release tablet 1 po bid 10/8/21  Yes Larry Peralta MD   sildenafil (REVATIO) 20 MG tablet TAKE 1 TABLET BY MOUTH FIVE TIMES DAILY AS NEEDED 10/8/21   Larry Peralta MD          HPI: Brett presents to me as a new patient today.  Medical history of diabetes hypertension low testosterone sleep apnea and skin staph infection that recurs.

## 2025-02-18 ENCOUNTER — TRANSCRIBE ORDERS (OUTPATIENT)
Dept: SLEEP CENTER | Age: 40
End: 2025-02-18

## 2025-02-18 DIAGNOSIS — G47.30 SLEEP APNEA, UNSPECIFIED TYPE: Primary | ICD-10-CM

## 2025-02-28 ENCOUNTER — TELEPHONE (OUTPATIENT)
Dept: PRIMARY CARE CLINIC | Age: 40
End: 2025-02-28

## 2025-02-28 NOTE — TELEPHONE ENCOUNTER
Patient's endocrinologist, Dr. Holloway is going to [facility]. [facility] is out of network for patients insurance. Asking if you can refer to a new endocrinologist.     Patient also said that at his last appointment, you had discussed starting him on Tirzepatide. He said he is now agreeable to starting medication and would like to start on it.

## 2025-03-10 ENCOUNTER — HOSPITAL ENCOUNTER (OUTPATIENT)
Dept: SLEEP CENTER | Age: 40
Discharge: HOME | End: 2025-03-10
Payer: COMMERCIAL

## 2025-03-10 DIAGNOSIS — G47.30 SLEEP APNEA, UNSPECIFIED TYPE: ICD-10-CM

## 2025-03-10 PROCEDURE — 95800 SLP STDY UNATTENDED: CPT

## 2025-03-11 RX ORDER — TELMISARTAN 80 MG/1
80 TABLET ORAL DAILY
Qty: 30 TABLET | Refills: 1 | Status: SHIPPED | OUTPATIENT
Start: 2025-03-11

## 2025-03-11 NOTE — TELEPHONE ENCOUNTER
Name of Medication(s) Requested:  Requested Prescriptions      No prescriptions requested or ordered in this encounter       Medication is on current medication list Yes    Dosage and directions were verified? Yes    Quantity verified: 30 day supply     Pharmacy Verified?  Yes    Last Appointment:  2/14/2025    Future appts:  Future Appointments   Date Time Provider Department Center   3/28/2025  8:15 AM Godfrey Shelton DO [facility]        (If no appt send self scheduling link. .REFILLAPPT)  Scheduling request sent?     [] Yes  [] No    Does patient need updated?  [] Yes  [] No

## 2025-03-14 NOTE — PROCEDURES
[place] [address]     HOME SLEEP STUDY REPORT       PATIENT NAME: Marcel Cornell               : 1985        MED REC NO:  [id number]     ACCOUNT NO:     [id number]  PROVIDER:  Rita Nunez DO  DATE OF STUDY: 03/10/2025      SERVICE PROVIDER:  [facility]     REFERRING PROVIDER: Godfrey Shelton DO     AGE: 39 y.o.     SEX: male        BMI: 53.5 (W=425 lb, H=6' 3'') NECK CIRCUMFERENCE: 21 '' in    Symptoms: Snoring, witnessed apneas, wakes gasping, excessive daytime sleepiness, nocturia, difficulty initiating/maintaining sleep, restless sleep, and palpitations . The Fontanelle Sleepiness Scale was 16 out of 24 (scores above or equal to 10 are suggestive of hypersomnolence).      Indication: Evaluate for obstructive sleep apnea.    Past Medical History:   Diagnosis Date    Hypertension          Current Outpatient Medications:     telmisartan (MICARDIS) 80 MG tablet, Take 1 tablet by mouth daily, Disp: 30 tablet, Rfl: 1    testosterone cypionate (DEPOTESTOTERONE CYPIONATE) 200 MG/ML injection, Inject 0.5 mLs into the muscle once a week., Disp: , Rfl:     furosemide (LASIX) 20 MG tablet, Take 1 tablet by mouth daily, Disp: , Rfl:     fluticasone (FLONASE) 50 MCG/ACT nasal spray, 1 spray by Each Nostril route daily as needed for Rhinitis, Disp: , Rfl:     metoprolol succinate (TOPROL XL) 100 MG extended release tablet, Take 1 tablet by mouth daily, Disp: 30 tablet, Rfl: 3    Semaglutide (RYBELSUS) 14 MG TABS, Take 14 mg by mouth, Disp: , Rfl:     doxycycline hyclate (VIBRAMYCIN) 100 MG capsule, Take 1 capsule by mouth 2 times daily, Disp: 20 capsule, Rfl: 2    omeprazole (PRILOSEC) 40 MG delayed release capsule, TAKE 1 CAPSULE BY MOUTH EVERY DAY, Disp: , Rfl:     loratadine (CLARITIN) 10 MG tablet, Take 1 tablet by mouth daily, Disp: , Rfl:     escitalopram

## 2025-03-18 ENCOUNTER — HOSPITAL ENCOUNTER (OUTPATIENT)
Age: 40
Discharge: HOME | End: 2025-03-18
Payer: COMMERCIAL

## 2025-03-18 DIAGNOSIS — E11.9 TYPE 2 DIABETES MELLITUS WITHOUT COMPLICATION, WITHOUT LONG-TERM CURRENT USE OF INSULIN: ICD-10-CM

## 2025-03-18 DIAGNOSIS — G47.30 SLEEP APNEA, UNSPECIFIED TYPE: ICD-10-CM

## 2025-03-18 DIAGNOSIS — I10 PRIMARY HYPERTENSION: ICD-10-CM

## 2025-03-18 DIAGNOSIS — E29.1 HYPOTESTOSTERONEMIA IN MALE: ICD-10-CM

## 2025-03-18 LAB
ALBUMIN SERPL-MCNC: 4.1 G/DL (ref 3.5–5.2)
ALP SERPL-CCNC: 54 U/L (ref 40–129)
ALT SERPL-CCNC: 28 U/L (ref 0–40)
ANION GAP SERPL CALCULATED.3IONS-SCNC: 8 MMOL/L (ref 7–16)
AST SERPL-CCNC: 25 U/L (ref 0–39)
BASOPHILS # BLD: 0.03 K/UL (ref 0–0.2)
BASOPHILS NFR BLD: 0 % (ref 0–2)
BILIRUB SERPL-MCNC: 0.4 MG/DL (ref 0–1.2)
BUN SERPL-MCNC: 17 MG/DL (ref 6–20)
CALCIUM SERPL-MCNC: 9.5 MG/DL (ref 8.6–10.2)
CHLORIDE SERPL-SCNC: 100 MMOL/L (ref 98–107)
CHOLEST SERPL-MCNC: 173 MG/DL
CO2 SERPL-SCNC: 29 MMOL/L (ref 22–29)
CREAT SERPL-MCNC: 1 MG/DL (ref 0.7–1.2)
EOSINOPHIL # BLD: 0.17 K/UL (ref 0.05–0.5)
EOSINOPHILS RELATIVE PERCENT: 2 % (ref 0–6)
ERYTHROCYTE [DISTWIDTH] IN BLOOD BY AUTOMATED COUNT: 14.6 % (ref 11.5–15)
GFR, ESTIMATED: >90 ML/MIN/1.73M2
GLUCOSE SERPL-MCNC: 135 MG/DL (ref 74–99)
HBA1C MFR BLD: 6.2 % (ref 4–5.6)
HCT VFR BLD AUTO: 49.9 % (ref 37–54)
HCT VFR BLD AUTO: 50.2 % (ref 37–54)
HDLC SERPL-MCNC: 40 MG/DL
HGB BLD-MCNC: 16.5 G/DL (ref 12.5–16.5)
HGB BLD-MCNC: 16.6 G/DL (ref 12.5–16.5)
IMM GRANULOCYTES # BLD AUTO: 0.04 K/UL (ref 0–0.58)
IMM GRANULOCYTES NFR BLD: 0 % (ref 0–5)
LDLC SERPL CALC-MCNC: 113 MG/DL
LYMPHOCYTES NFR BLD: 1.69 K/UL (ref 1.5–4)
LYMPHOCYTES RELATIVE PERCENT: 19 % (ref 20–42)
MCH RBC QN AUTO: 27 PG (ref 26–35)
MCHC RBC AUTO-ENTMCNC: 33.1 G/DL (ref 32–34.5)
MCV RBC AUTO: 81.8 FL (ref 80–99.9)
MONOCYTES NFR BLD: 0.65 K/UL (ref 0.1–0.95)
MONOCYTES NFR BLD: 7 % (ref 2–12)
NEUTROPHILS NFR BLD: 71 % (ref 43–80)
NEUTS SEG NFR BLD: 6.34 K/UL (ref 1.8–7.3)
PLATELET # BLD AUTO: 264 K/UL (ref 130–450)
PMV BLD AUTO: 10.4 FL (ref 7–12)
POTASSIUM SERPL-SCNC: 4.2 MMOL/L (ref 3.5–5)
PROT SERPL-MCNC: 7.3 G/DL (ref 6.4–8.3)
PSA SERPL-MCNC: 0.23 NG/ML (ref 0–4)
RBC # BLD AUTO: 6.1 M/UL (ref 3.8–5.8)
SODIUM SERPL-SCNC: 137 MMOL/L (ref 132–146)
T4 SERPL-MCNC: 5.6 UG/DL (ref 4.5–11.7)
TESTOST SERPL-MCNC: 870 NG/DL (ref 249–836)
TRIGL SERPL-MCNC: 98 MG/DL
TSH SERPL DL<=0.05 MIU/L-ACNC: 2.77 UIU/ML (ref 0.27–4.2)
VLDLC SERPL CALC-MCNC: 20 MG/DL
WBC OTHER # BLD: 8.9 K/UL (ref 4.5–11.5)

## 2025-03-18 PROCEDURE — G0103 PSA SCREENING: HCPCS

## 2025-03-18 PROCEDURE — 80053 COMPREHEN METABOLIC PANEL: CPT

## 2025-03-18 PROCEDURE — 85025 COMPLETE CBC W/AUTO DIFF WBC: CPT

## 2025-03-18 PROCEDURE — 83036 HEMOGLOBIN GLYCOSYLATED A1C: CPT

## 2025-03-18 PROCEDURE — 85014 HEMATOCRIT: CPT

## 2025-03-18 PROCEDURE — 84443 ASSAY THYROID STIM HORMONE: CPT

## 2025-03-18 PROCEDURE — 84436 ASSAY OF TOTAL THYROXINE: CPT

## 2025-03-18 PROCEDURE — 36415 COLL VENOUS BLD VENIPUNCTURE: CPT

## 2025-03-18 PROCEDURE — 85018 HEMOGLOBIN: CPT

## 2025-03-18 PROCEDURE — 80061 LIPID PANEL: CPT

## 2025-03-18 PROCEDURE — 84403 ASSAY OF TOTAL TESTOSTERONE: CPT

## 2025-03-26 ENCOUNTER — OFFICE VISIT (OUTPATIENT)
Dept: FAMILY MEDICINE CLINIC | Age: 40
End: 2025-03-26
Payer: COMMERCIAL

## 2025-03-26 VITALS
OXYGEN SATURATION: 97 % | TEMPERATURE: 98.3 F | HEIGHT: 75 IN | SYSTOLIC BLOOD PRESSURE: 160 MMHG | WEIGHT: 315 LBS | BODY MASS INDEX: 39.17 KG/M2 | DIASTOLIC BLOOD PRESSURE: 100 MMHG | HEART RATE: 88 BPM

## 2025-03-26 DIAGNOSIS — G47.33 OBSTRUCTIVE SLEEP APNEA SYNDROME: ICD-10-CM

## 2025-03-26 DIAGNOSIS — J02.9 SORE THROAT: Primary | ICD-10-CM

## 2025-03-26 DIAGNOSIS — I10 PRIMARY HYPERTENSION: ICD-10-CM

## 2025-03-26 LAB
Lab: NORMAL
PERFORMING INSTRUMENT: NORMAL
QC PASS/FAIL: NORMAL
S PYO AG THROAT QL: POSITIVE
SARS-COV-2, POC: NORMAL

## 2025-03-26 PROCEDURE — 3077F SYST BP >= 140 MM HG: CPT | Performed by: FAMILY MEDICINE

## 2025-03-26 PROCEDURE — 87426 SARSCOV CORONAVIRUS AG IA: CPT | Performed by: FAMILY MEDICINE

## 2025-03-26 PROCEDURE — 3080F DIAST BP >= 90 MM HG: CPT | Performed by: FAMILY MEDICINE

## 2025-03-26 PROCEDURE — 99214 OFFICE O/P EST MOD 30 MIN: CPT | Performed by: FAMILY MEDICINE

## 2025-03-26 PROCEDURE — 87880 STREP A ASSAY W/OPTIC: CPT | Performed by: FAMILY MEDICINE

## 2025-03-26 ASSESSMENT — ENCOUNTER SYMPTOMS
RESPIRATORY NEGATIVE: 1
GASTROINTESTINAL NEGATIVE: 1
EYES NEGATIVE: 1
ALLERGIC/IMMUNOLOGIC NEGATIVE: 1
SORE THROAT: 1

## 2025-03-26 NOTE — PROGRESS NOTES
3/26/25     Marcel Cornell    : 1985 Sex: male   Age: 39 y.o.      Chief Complaint   Patient presents with    Pharyngitis    Cyst       Prior to Admission medications    Medication Sig Start Date End Date Taking? Authorizing Provider   amoxicillin-clavulanate (AUGMENTIN) 875-125 MG per tablet Take 1 tablet by mouth 2 times daily for 10 days 3/26/25 4/5/25 Yes Godfrey Shelton DO   telmisartan (MICARDIS) 80 MG tablet Take 1 tablet by mouth daily 3/11/25  Yes Godfrey Shelton DO   testosterone cypionate (DEPOTESTOTERONE CYPIONATE) 200 MG/ML injection Inject 0.5 mLs into the muscle once a week. 25  Yes Larry Peralta MD   furosemide (LASIX) 20 MG tablet Take 1 tablet by mouth daily   Yes Larry Peralta MD   fluticasone (FLONASE) 50 MCG/ACT nasal spray 1 spray by Each Nostril route daily as needed for Rhinitis   Yes ProviderLarry MD   metoprolol succinate (TOPROL XL) 100 MG extended release tablet Take 1 tablet by mouth daily 25  Yes Godfrey Shelton DO   Semaglutide (RYBELSUS) 14 MG TABS Take 14 mg by mouth   Yes Larry Peralta MD   omeprazole (PRILOSEC) 40 MG delayed release capsule TAKE 1 CAPSULE BY MOUTH EVERY DAY 21  Yes Larry Peralta MD   loratadine (CLARITIN) 10 MG tablet Take 1 tablet by mouth daily   Yes Larry Peralta MD   escitalopram (LEXAPRO) 20 MG tablet escitalopram 20 mg tablet   TAKE 1 TABLET BY MOUTH EVERY DAY   Yes Larry Peralta MD   busPIRone (BUSPAR) 10 MG tablet TAKE 1 TABLET BY MOUTH TWICE DAILY AS NEEDED 10/20/21  Yes Larry Peralta MD   metFORMIN (GLUCOPHAGE-XR) 500 MG extended release tablet 1 po bid 10/8/21  Yes Larry Peralta MD   sildenafil (REVATIO) 20 MG tablet TAKE 1 TABLET BY MOUTH FIVE TIMES DAILY AS NEEDED 10/8/21  Yes Larry Peralta MD          HPI: Patient evaluated today hypertension sleep apnea throat irritation and he does have strep pharyngitis.  Additionally skin sore scalp

## 2025-04-18 NOTE — PROGRESS NOTES
[facility]    Patient Name: Marcel Cornell  Age: 40 y.o.   : 1985  Date of Visit: 25    Assessment and Plan:     1. ADILIA (obstructive sleep apnea)  - will order PAP after titration  - F-U 3-4 months    2. Primary hypertension  The patient's blood pressure in office today was elevated. Looking at recent trends, their in office blood pressures have been consistently high. Recommend ambulatory blood pressure monitoring and discussion with their primary care provider for consideration of additional therapy. Asymptomatic in clinic. Urged to get into PCP office quickly to get on an optimal BP regimen.      History:    HPI   Marcel Cornell is a 40 y.o. male with  has a past medical history of Hypertension. who presents as a new patient to Sleep Clinic, referred by Dr. Shelton, for Sleep Apnea    - Here for HST results  - results consistent with severe ADILIA  - He would like to try autoCPAP  - Needs titration first per [facility]    PMH:  Past Medical History:   Diagnosis Date    Hypertension         PSH:  Past Surgical History:   Procedure Laterality Date    MYRINGOTOMY      x 5        Soc Hx:  Social History     Tobacco Use    Smoking status: Former    Smokeless tobacco: Current   Substance Use Topics    Alcohol use: No     Comment: occ    Drug use: No        Fam Hx:  No family history on file.     Current Outpatient Medications   Medication Sig Dispense Refill    telmisartan (MICARDIS) 80 MG tablet Take 1 tablet by mouth daily 30 tablet 1    testosterone cypionate (DEPOTESTOTERONE CYPIONATE) 200 MG/ML injection Inject 0.5 mLs into the muscle once a week.      furosemide (LASIX) 20 MG tablet Take 1 tablet by mouth daily      fluticasone (FLONASE) 50 MCG/ACT nasal spray 1 spray by Each Nostril route daily as needed for Rhinitis      metoprolol succinate (TOPROL XL) 100 MG extended release tablet Take 1 tablet by mouth daily 30 tablet 3    Semaglutide (RYBELSUS) 14 MG TABS Take 14 mg by mouth

## 2025-04-21 ENCOUNTER — OFFICE VISIT (OUTPATIENT)
Dept: SLEEP MEDICINE | Age: 40
End: 2025-04-21
Payer: COMMERCIAL

## 2025-04-21 VITALS
WEIGHT: 315 LBS | HEIGHT: 75 IN | HEART RATE: 88 BPM | OXYGEN SATURATION: 99 % | RESPIRATION RATE: 16 BRPM | SYSTOLIC BLOOD PRESSURE: 175 MMHG | TEMPERATURE: 98.8 F | BODY MASS INDEX: 39.17 KG/M2 | DIASTOLIC BLOOD PRESSURE: 95 MMHG

## 2025-04-21 DIAGNOSIS — I10 PRIMARY HYPERTENSION: ICD-10-CM

## 2025-04-21 DIAGNOSIS — G47.33 OSA (OBSTRUCTIVE SLEEP APNEA): Primary | ICD-10-CM

## 2025-04-21 PROCEDURE — 3080F DIAST BP >= 90 MM HG: CPT | Performed by: STUDENT IN AN ORGANIZED HEALTH CARE EDUCATION/TRAINING PROGRAM

## 2025-04-21 PROCEDURE — 3077F SYST BP >= 140 MM HG: CPT | Performed by: STUDENT IN AN ORGANIZED HEALTH CARE EDUCATION/TRAINING PROGRAM

## 2025-04-21 PROCEDURE — 99204 OFFICE O/P NEW MOD 45 MIN: CPT | Performed by: STUDENT IN AN ORGANIZED HEALTH CARE EDUCATION/TRAINING PROGRAM

## 2025-04-21 ASSESSMENT — SLEEP AND FATIGUE QUESTIONNAIRES
HOW LIKELY ARE YOU TO NOD OFF OR FALL ASLEEP WHEN YOU ARE A PASSENGER IN A CAR FOR AN HOUR WITHOUT A BREAK: HIGH CHANCE OF DOZING
HOW LIKELY ARE YOU TO NOD OFF OR FALL ASLEEP WHILE LYING DOWN TO REST IN THE AFTERNOON WHEN CIRCUMSTANCES PERMIT: HIGH CHANCE OF DOZING
HOW LIKELY ARE YOU TO NOD OFF OR FALL ASLEEP WHILE SITTING INACTIVE IN A PUBLIC PLACE: HIGH CHANCE OF DOZING
HOW LIKELY ARE YOU TO NOD OFF OR FALL ASLEEP WHILE WATCHING TV: HIGH CHANCE OF DOZING
ESS TOTAL SCORE: 20
HOW LIKELY ARE YOU TO NOD OFF OR FALL ASLEEP IN A CAR, WHILE STOPPED FOR A FEW MINUTES IN TRAFFIC: SLIGHT CHANCE OF DOZING
HOW LIKELY ARE YOU TO NOD OFF OR FALL ASLEEP WHILE SITTING QUIETLY AFTER LUNCH WITHOUT ALCOHOL: HIGH CHANCE OF DOZING
HOW LIKELY ARE YOU TO NOD OFF OR FALL ASLEEP WHILE SITTING AND TALKING TO SOMEONE: SLIGHT CHANCE OF DOZING
HOW LIKELY ARE YOU TO NOD OFF OR FALL ASLEEP WHILE SITTING AND READING: HIGH CHANCE OF DOZING

## 2025-04-25 ENCOUNTER — OFFICE VISIT (OUTPATIENT)
Dept: PRIMARY CARE CLINIC | Age: 40
End: 2025-04-25
Payer: COMMERCIAL

## 2025-04-25 DIAGNOSIS — I10 PRIMARY HYPERTENSION: ICD-10-CM

## 2025-04-25 DIAGNOSIS — S93.401A SPRAIN OF RIGHT ANKLE, UNSPECIFIED LIGAMENT, INITIAL ENCOUNTER: Primary | ICD-10-CM

## 2025-04-25 DIAGNOSIS — E11.9 TYPE 2 DIABETES MELLITUS WITHOUT COMPLICATION, WITHOUT LONG-TERM CURRENT USE OF INSULIN (HCC): ICD-10-CM

## 2025-04-25 DIAGNOSIS — J01.00 ACUTE MAXILLARY SINUSITIS, RECURRENCE NOT SPECIFIED: ICD-10-CM

## 2025-04-25 PROBLEM — J02.9 SORE THROAT: Status: RESOLVED | Noted: 2025-03-26 | Resolved: 2025-04-25

## 2025-04-25 PROCEDURE — 99214 OFFICE O/P EST MOD 30 MIN: CPT | Performed by: FAMILY MEDICINE

## 2025-04-25 PROCEDURE — 3044F HG A1C LEVEL LT 7.0%: CPT | Performed by: FAMILY MEDICINE

## 2025-04-25 RX ORDER — PREDNISONE 5 MG/1
TABLET ORAL
Qty: 30 TABLET | Refills: 0 | Status: SHIPPED | OUTPATIENT
Start: 2025-04-25

## 2025-04-25 ASSESSMENT — ENCOUNTER SYMPTOMS
EYES NEGATIVE: 1
GASTROINTESTINAL NEGATIVE: 1
SINUS PAIN: 1
ALLERGIC/IMMUNOLOGIC NEGATIVE: 1
COUGH: 1
SINUS PRESSURE: 1

## 2025-04-25 NOTE — PROGRESS NOTES
mouth, Disp: , Rfl:     omeprazole (PRILOSEC) 40 MG delayed release capsule, TAKE 1 CAPSULE BY MOUTH EVERY DAY, Disp: , Rfl:     loratadine (CLARITIN) 10 MG tablet, Take 1 tablet by mouth daily, Disp: , Rfl:     escitalopram (LEXAPRO) 20 MG tablet, escitalopram 20 mg tablet  TAKE 1 TABLET BY MOUTH EVERY DAY, Disp: , Rfl:     busPIRone (BUSPAR) 10 MG tablet, TAKE 1 TABLET BY MOUTH TWICE DAILY AS NEEDED, Disp: , Rfl:     metFORMIN (GLUCOPHAGE-XR) 500 MG extended release tablet, 1 po bid, Disp: , Rfl:     sildenafil (REVATIO) 20 MG tablet, TAKE 1 TABLET BY MOUTH FIVE TIMES DAILY AS NEEDED, Disp: , Rfl:     Allergies   Allergen Reactions    Sulfa Antibiotics Anaphylaxis    Hydrochlorothiazide     Lisinopril Cough and Other (See Comments)     cough       Social History     Tobacco Use    Smoking status: Former    Smokeless tobacco: Current   Substance Use Topics    Alcohol use: No     Comment: occ    Drug use: No      Past Surgical History:   Procedure Laterality Date    MYRINGOTOMY      x 5     No family history on file.  Past Medical History:   Diagnosis Date    Hypertension        There were no vitals filed for this visit.  BP Readings from Last 3 Encounters:   04/21/25 (!) 175/95   03/26/25 (!) 160/100   02/14/25 130/80        Physical Exam  Vitals and nursing note reviewed.   Constitutional:       Appearance: He is well-developed.   HENT:      Head: Normocephalic.      Right Ear: External ear normal.      Left Ear: External ear normal.      Nose: Congestion present.   Eyes:      Conjunctiva/sclera: Conjunctivae normal.      Pupils: Pupils are equal, round, and reactive to light.   Cardiovascular:      Rate and Rhythm: Normal rate.   Pulmonary:      Breath sounds: Normal breath sounds.   Abdominal:      General: Bowel sounds are normal.      Palpations: Abdomen is soft.   Musculoskeletal:         General: Normal range of motion.      Cervical back: Normal range of motion and neck supple.   Skin:     General: Skin is

## 2025-04-26 VITALS
TEMPERATURE: 97.8 F | DIASTOLIC BLOOD PRESSURE: 80 MMHG | BODY MASS INDEX: 39.17 KG/M2 | WEIGHT: 315 LBS | HEIGHT: 75 IN | HEART RATE: 78 BPM | OXYGEN SATURATION: 94 % | SYSTOLIC BLOOD PRESSURE: 146 MMHG

## 2025-04-30 RX ORDER — OMEPRAZOLE 40 MG/1
40 CAPSULE, DELAYED RELEASE ORAL DAILY
Qty: 30 CAPSULE | Refills: 5 | Status: SHIPPED | OUTPATIENT
Start: 2025-04-30

## 2025-04-30 RX ORDER — ESCITALOPRAM OXALATE 20 MG/1
20 TABLET ORAL DAILY
Qty: 30 TABLET | Refills: 5 | Status: SHIPPED | OUTPATIENT
Start: 2025-04-30

## 2025-04-30 NOTE — TELEPHONE ENCOUNTER
Last Appointment:  4/25/2025  Future Appointments   Date Time Provider Department Center   5/14/2025 10:00 PM [facility]

## 2025-05-12 ENCOUNTER — TELEPHONE (OUTPATIENT)
Dept: SLEEP MEDICINE | Age: 40
End: 2025-05-12

## 2025-05-12 DIAGNOSIS — G47.33 OSA (OBSTRUCTIVE SLEEP APNEA): ICD-10-CM

## 2025-05-12 NOTE — TELEPHONE ENCOUNTER
[facility] so he wants to cancel his titration study and just have the CPAP order sent to [facility] and he will pay out of pocket. Placing order.

## 2025-05-22 RX ORDER — ORAL SEMAGLUTIDE 14 MG/1
14 TABLET ORAL DAILY
Qty: 30 TABLET | Refills: 5 | Status: SHIPPED | OUTPATIENT
Start: 2025-05-22

## 2025-05-22 NOTE — TELEPHONE ENCOUNTER
Last Appointment:  4/25/2025  Future Appointments   Date Time Provider Department Center   8/25/2025  8:30 AM Nic Martínez MD [facility]       Patient's Endo left and is asking if you could refill his rybelsus

## 2025-05-30 RX ORDER — TELMISARTAN 80 MG/1
80 TABLET ORAL DAILY
Qty: 30 TABLET | Refills: 1 | Status: SHIPPED | OUTPATIENT
Start: 2025-05-30

## 2025-06-09 RX ORDER — METOPROLOL SUCCINATE 100 MG/1
100 TABLET, EXTENDED RELEASE ORAL DAILY
Qty: 30 TABLET | Refills: 5 | Status: SHIPPED | OUTPATIENT
Start: 2025-06-09

## 2025-06-09 NOTE — TELEPHONE ENCOUNTER
Name of Medication(s) Requested:  Requested Prescriptions     Pending Prescriptions Disp Refills    metoprolol succinate (TOPROL XL) 100 MG extended release tablet [Pharmacy Med Name: metoprolol succinate  mg tablet,extended release 24 hr] 30 tablet 5     Sig: TAKE ONE TABLET BY MOUTH ONCE DAILY       Medication is on current medication list Yes    Dosage and directions were verified? Yes    Quantity verified: 30 day supply     Pharmacy Verified?  Yes    Last Appointment:  4/25/2025    Future appts:  Future Appointments   Date Time Provider Department Center   8/25/2025  8:30 AM Nic Martínez MD [facility]        (If no appt send self scheduling link. .REFILLAPPT)  Scheduling request sent?     [] Yes  [x] No    Does patient need updated?  [] Yes  [x] No

## 2025-06-28 ENCOUNTER — APPOINTMENT (OUTPATIENT)
Dept: GENERAL RADIOLOGY | Age: 40
End: 2025-06-28
Payer: COMMERCIAL

## 2025-06-28 ENCOUNTER — APPOINTMENT (OUTPATIENT)
Dept: CT IMAGING | Age: 40
End: 2025-06-28
Payer: COMMERCIAL

## 2025-06-28 ENCOUNTER — HOSPITAL ENCOUNTER (EMERGENCY)
Age: 40
Discharge: HOME | End: 2025-06-28
Attending: EMERGENCY MEDICINE
Payer: COMMERCIAL

## 2025-06-28 VITALS
RESPIRATION RATE: 21 BRPM | TEMPERATURE: 98.7 F | SYSTOLIC BLOOD PRESSURE: 162 MMHG | OXYGEN SATURATION: 95 % | WEIGHT: 315 LBS | BODY MASS INDEX: 52.5 KG/M2 | HEART RATE: 105 BPM | DIASTOLIC BLOOD PRESSURE: 86 MMHG

## 2025-06-28 DIAGNOSIS — I10 ESSENTIAL HYPERTENSION: ICD-10-CM

## 2025-06-28 DIAGNOSIS — R06.00 DYSPNEA AND RESPIRATORY ABNORMALITIES: ICD-10-CM

## 2025-06-28 DIAGNOSIS — R06.89 DYSPNEA AND RESPIRATORY ABNORMALITIES: ICD-10-CM

## 2025-06-28 DIAGNOSIS — J18.9 PNEUMONIA OF RIGHT LUNG DUE TO INFECTIOUS ORGANISM, UNSPECIFIED PART OF LUNG: Primary | ICD-10-CM

## 2025-06-28 LAB
ALBUMIN SERPL-MCNC: 4.1 G/DL (ref 3.5–5.2)
ALP SERPL-CCNC: 58 U/L (ref 40–129)
ALT SERPL-CCNC: 26 U/L (ref 0–50)
ANION GAP SERPL CALCULATED.3IONS-SCNC: 13 MMOL/L (ref 7–16)
AST SERPL-CCNC: 23 U/L (ref 0–50)
BASOPHILS # BLD: 0.04 K/UL (ref 0–0.2)
BASOPHILS NFR BLD: 0 % (ref 0–2)
BILIRUB SERPL-MCNC: 0.5 MG/DL (ref 0–1.2)
BNP SERPL-MCNC: 292 PG/ML (ref 0–125)
BUN SERPL-MCNC: 17 MG/DL (ref 6–20)
CALCIUM SERPL-MCNC: 9.1 MG/DL (ref 8.6–10)
CHLORIDE SERPL-SCNC: 101 MMOL/L (ref 98–107)
CO2 SERPL-SCNC: 23 MMOL/L (ref 22–29)
CREAT SERPL-MCNC: 1 MG/DL (ref 0.7–1.2)
D-DIMER QUANTITATIVE: <200 NG/ML DDU (ref 0–230)
EKG ATRIAL RATE: 102 BPM
EKG P AXIS: 50 DEGREES
EKG P-R INTERVAL: 158 MS
EKG Q-T INTERVAL: 350 MS
EKG QRS DURATION: 90 MS
EKG QTC CALCULATION (BAZETT): 456 MS
EKG R AXIS: 26 DEGREES
EKG T AXIS: 31 DEGREES
EKG VENTRICULAR RATE: 102 BPM
EOSINOPHIL # BLD: 0.14 K/UL (ref 0.05–0.5)
EOSINOPHILS RELATIVE PERCENT: 1 % (ref 0–6)
ERYTHROCYTE [DISTWIDTH] IN BLOOD BY AUTOMATED COUNT: 16.5 % (ref 11.5–15)
GFR, ESTIMATED: >90 ML/MIN/1.73M2
GLUCOSE SERPL-MCNC: 165 MG/DL (ref 74–99)
HCT VFR BLD AUTO: 51.9 % (ref 37–54)
HGB BLD-MCNC: 17.2 G/DL (ref 12.5–16.5)
IMM GRANULOCYTES # BLD AUTO: 0.07 K/UL (ref 0–0.58)
IMM GRANULOCYTES NFR BLD: 1 % (ref 0–5)
LYMPHOCYTES NFR BLD: 1.22 K/UL (ref 1.5–4)
LYMPHOCYTES RELATIVE PERCENT: 10 % (ref 20–42)
MCH RBC QN AUTO: 26.7 PG (ref 26–35)
MCHC RBC AUTO-ENTMCNC: 33.1 G/DL (ref 32–34.5)
MCV RBC AUTO: 80.6 FL (ref 80–99.9)
MONOCYTES NFR BLD: 0.82 K/UL (ref 0.1–0.95)
MONOCYTES NFR BLD: 7 % (ref 2–12)
NEUTROPHILS NFR BLD: 81 % (ref 43–80)
NEUTS SEG NFR BLD: 9.39 K/UL (ref 1.8–7.3)
PH VENOUS: 7.4 (ref 7.35–7.45)
PLATELET # BLD AUTO: 281 K/UL (ref 130–450)
PMV BLD AUTO: 11 FL (ref 7–12)
POTASSIUM SERPL-SCNC: 4.5 MMOL/L (ref 3.5–5.1)
PROT SERPL-MCNC: 7.3 G/DL (ref 6.4–8.3)
RBC # BLD AUTO: 6.44 M/UL (ref 3.8–5.8)
SODIUM SERPL-SCNC: 138 MMOL/L (ref 136–145)
TROPONIN I SERPL HS-MCNC: 20 NG/L (ref 0–22)
TROPONIN I SERPL HS-MCNC: 20 NG/L (ref 0–22)
WBC OTHER # BLD: 11.7 K/UL (ref 4.5–11.5)

## 2025-06-28 PROCEDURE — 96376 TX/PRO/DX INJ SAME DRUG ADON: CPT

## 2025-06-28 PROCEDURE — 6370000000 HC RX 637 (ALT 250 FOR IP)

## 2025-06-28 PROCEDURE — 93005 ELECTROCARDIOGRAM TRACING: CPT

## 2025-06-28 PROCEDURE — 85025 COMPLETE CBC W/AUTO DIFF WBC: CPT

## 2025-06-28 PROCEDURE — 94640 AIRWAY INHALATION TREATMENT: CPT

## 2025-06-28 PROCEDURE — 96374 THER/PROPH/DIAG INJ IV PUSH: CPT

## 2025-06-28 PROCEDURE — 84484 ASSAY OF TROPONIN QUANT: CPT

## 2025-06-28 PROCEDURE — 96375 TX/PRO/DX INJ NEW DRUG ADDON: CPT

## 2025-06-28 PROCEDURE — 6370000000 HC RX 637 (ALT 250 FOR IP): Performed by: EMERGENCY MEDICINE

## 2025-06-28 PROCEDURE — 82800 BLOOD PH: CPT

## 2025-06-28 PROCEDURE — 6360000002 HC RX W HCPCS: Performed by: STUDENT IN AN ORGANIZED HEALTH CARE EDUCATION/TRAINING PROGRAM

## 2025-06-28 PROCEDURE — 71045 X-RAY EXAM CHEST 1 VIEW: CPT

## 2025-06-28 PROCEDURE — 6360000002 HC RX W HCPCS

## 2025-06-28 PROCEDURE — 6360000002 HC RX W HCPCS: Performed by: EMERGENCY MEDICINE

## 2025-06-28 PROCEDURE — 85379 FIBRIN DEGRADATION QUANT: CPT

## 2025-06-28 PROCEDURE — 80053 COMPREHEN METABOLIC PANEL: CPT

## 2025-06-28 PROCEDURE — 99285 EMERGENCY DEPT VISIT HI MDM: CPT

## 2025-06-28 PROCEDURE — 83880 ASSAY OF NATRIURETIC PEPTIDE: CPT

## 2025-06-28 PROCEDURE — 6360000004 HC RX CONTRAST MEDICATION: Performed by: RADIOLOGY

## 2025-06-28 PROCEDURE — 94664 DEMO&/EVAL PT USE INHALER: CPT

## 2025-06-28 PROCEDURE — 2500000003 HC RX 250 WO HCPCS

## 2025-06-28 PROCEDURE — 71260 CT THORAX DX C+: CPT

## 2025-06-28 RX ORDER — PREDNISONE 50 MG/1
50 TABLET ORAL DAILY
Qty: 5 TABLET | Refills: 0 | Status: SHIPPED | OUTPATIENT
Start: 2025-06-28 | End: 2025-06-28

## 2025-06-28 RX ORDER — CEFDINIR 300 MG/1
300 CAPSULE ORAL 2 TIMES DAILY
Qty: 14 CAPSULE | Refills: 0 | Status: SHIPPED | OUTPATIENT
Start: 2025-06-28 | End: 2025-06-28

## 2025-06-28 RX ORDER — PREDNISONE 50 MG/1
50 TABLET ORAL DAILY
Qty: 5 TABLET | Refills: 0 | Status: SHIPPED | OUTPATIENT
Start: 2025-06-28 | End: 2025-07-03

## 2025-06-28 RX ORDER — IPRATROPIUM BROMIDE AND ALBUTEROL SULFATE 2.5; .5 MG/3ML; MG/3ML
1 SOLUTION RESPIRATORY (INHALATION)
Status: COMPLETED | OUTPATIENT
Start: 2025-06-28 | End: 2025-06-28

## 2025-06-28 RX ORDER — DOXYCYCLINE 100 MG/1
100 CAPSULE ORAL ONCE
Status: COMPLETED | OUTPATIENT
Start: 2025-06-28 | End: 2025-06-28

## 2025-06-28 RX ORDER — ALBUTEROL SULFATE 90 UG/1
2 INHALANT RESPIRATORY (INHALATION) 4 TIMES DAILY PRN
Qty: 18 G | Refills: 0 | Status: SHIPPED | OUTPATIENT
Start: 2025-06-28

## 2025-06-28 RX ORDER — DOXYCYCLINE HYCLATE 100 MG
100 TABLET ORAL 2 TIMES DAILY
Qty: 14 TABLET | Refills: 0 | Status: SHIPPED | OUTPATIENT
Start: 2025-06-28 | End: 2025-07-05

## 2025-06-28 RX ORDER — ALBUTEROL SULFATE 90 UG/1
2 INHALANT RESPIRATORY (INHALATION) 4 TIMES DAILY PRN
Qty: 18 G | Refills: 0 | Status: SHIPPED | OUTPATIENT
Start: 2025-06-28 | End: 2025-06-28

## 2025-06-28 RX ORDER — CEFDINIR 300 MG/1
300 CAPSULE ORAL 2 TIMES DAILY
Qty: 14 CAPSULE | Refills: 0 | Status: SHIPPED | OUTPATIENT
Start: 2025-06-28 | End: 2025-07-05

## 2025-06-28 RX ORDER — DOXYCYCLINE HYCLATE 100 MG
100 TABLET ORAL 2 TIMES DAILY
Qty: 14 TABLET | Refills: 0 | Status: SHIPPED | OUTPATIENT
Start: 2025-06-28 | End: 2025-06-28

## 2025-06-28 RX ORDER — HYDRALAZINE HYDROCHLORIDE 20 MG/ML
10 INJECTION INTRAMUSCULAR; INTRAVENOUS ONCE
Status: COMPLETED | OUTPATIENT
Start: 2025-06-28 | End: 2025-06-28

## 2025-06-28 RX ORDER — LABETALOL HYDROCHLORIDE 5 MG/ML
10 INJECTION, SOLUTION INTRAVENOUS ONCE
Status: COMPLETED | OUTPATIENT
Start: 2025-06-28 | End: 2025-06-28

## 2025-06-28 RX ORDER — DEXAMETHASONE SODIUM PHOSPHATE 10 MG/ML
10 INJECTION, SOLUTION INTRA-ARTICULAR; INTRALESIONAL; INTRAMUSCULAR; INTRAVENOUS; SOFT TISSUE ONCE
Status: COMPLETED | OUTPATIENT
Start: 2025-06-28 | End: 2025-06-28

## 2025-06-28 RX ORDER — IOPAMIDOL 755 MG/ML
75 INJECTION, SOLUTION INTRAVASCULAR
Status: COMPLETED | OUTPATIENT
Start: 2025-06-28 | End: 2025-06-28

## 2025-06-28 RX ORDER — LABETALOL HYDROCHLORIDE 5 MG/ML
5 INJECTION, SOLUTION INTRAVENOUS ONCE
Status: COMPLETED | OUTPATIENT
Start: 2025-06-28 | End: 2025-06-28

## 2025-06-28 RX ADMIN — WATER 2000 MG: 1 INJECTION INTRAMUSCULAR; INTRAVENOUS; SUBCUTANEOUS at 15:42

## 2025-06-28 RX ADMIN — DEXAMETHASONE SODIUM PHOSPHATE 10 MG: 10 INJECTION INTRAMUSCULAR; INTRAVENOUS at 13:17

## 2025-06-28 RX ADMIN — DOXYCYCLINE HYCLATE 100 MG: 100 CAPSULE ORAL at 15:43

## 2025-06-28 RX ADMIN — IPRATROPIUM BROMIDE AND ALBUTEROL SULFATE 1 DOSE: .5; 2.5 SOLUTION RESPIRATORY (INHALATION) at 13:20

## 2025-06-28 RX ADMIN — HYDRALAZINE HYDROCHLORIDE 10 MG: 20 INJECTION INTRAMUSCULAR; INTRAVENOUS at 15:41

## 2025-06-28 RX ADMIN — IPRATROPIUM BROMIDE AND ALBUTEROL SULFATE 1 DOSE: .5; 2.5 SOLUTION RESPIRATORY (INHALATION) at 13:18

## 2025-06-28 RX ADMIN — LABETALOL HYDROCHLORIDE 10 MG: 5 INJECTION INTRAVENOUS at 16:37

## 2025-06-28 RX ADMIN — IPRATROPIUM BROMIDE AND ALBUTEROL SULFATE 1 DOSE: .5; 2.5 SOLUTION RESPIRATORY (INHALATION) at 13:19

## 2025-06-28 RX ADMIN — LABETALOL HYDROCHLORIDE 5 MG: 5 INJECTION INTRAVENOUS at 13:18

## 2025-06-28 RX ADMIN — IOPAMIDOL 75 ML: 755 INJECTION, SOLUTION INTRAVENOUS at 14:40

## 2025-06-28 ASSESSMENT — PAIN - FUNCTIONAL ASSESSMENT: PAIN_FUNCTIONAL_ASSESSMENT: NONE - DENIES PAIN

## 2025-06-28 NOTE — DISCHARGE INSTRUCTIONS
Call your doctor or return to the Emergency Department immediately if you:      Have increasing shortness of breath not relieved by your inhalers or breathing treatments      Notice your lips or fingers turning blue or gray      Have severe chest pain or tightness      Feel unusually tired, confused, or drowsy      Are unable to speak in full sentences due to shortness of breath      Develop a high fever (>=100.4°F / 38°C) not controllable with tylenol, or worsening cough with thick, green/yellow, or bloody sputum      Notice swelling in your legs or rapid weight gain      Use your rescue inhaler more often without relief    General Instructions:      Take all prescribed medications as directed, including inhalers, steroids, and/or antibiotics      Use your rescue inhaler (e.g., albuterol) every 4-6 hours as needed for shortness of breath      Avoid smoke, strong odors, cold air, and other triggers      Use a spacer with your inhalers if available      Rest as needed, but try to stay active within your limits      Stay well hydrated unless told otherwise      Monitor your oxygen use carefully (if prescribed)    Follow-Up:        Please follow up with your primary care doctor or pulmonologist as soon as possible for an appointment      If you were prescribed steroids or antibiotics, finish the full course unless directed otherwise    We have provided Dr. Corado as pulmonology referral

## 2025-06-28 NOTE — ED PROVIDER NOTES
[facility] EMERGENCY DEPARTMENT  EMERGENCY DEPARTMENT ENCOUNTER        Pt Name: Marcel Cornell  MRN: [id number]  Birthdate 1985  Date of evaluation: 6/28/2025  Provider: Rocky Lebron MD  PCP: Godfrey Shelton DO  Note Started: 12:18 PM EDT 6/28/25    CHIEF COMPLAINT & HISTORY     Chief Complaint   Patient presents with    Shortness of Breath     SOB; 81% RA beginning today while walking in a parking lot. SOB with palpitations         History From:  pt  Marcel Cornell is a 40 y.o. male w/pmhx of COVID requiring intubation presents for SOB    HTN, sleep apnea, T2DM. Feels he can't get a good quality breath in. It began abruptly while having sex with his wife, during which he started to feel like he couldn't breath. He panicked and then got progressively more hypoxic. Per EMS when they arrived he was 81% on the parking lot and was put on 15L and improved immediately. He feels a tingling through his body occasionally. He uses a CPAP and is very compliant with it and considered using it today. He smoked last night and does not normally smoke. He has felt very congested all summer. He is on testosterone replacement therapy. He feels a bit better on the 3L NC he was on upon arrival. He feels he is tired. He was diaphoretic at the time and has had no chest pain. He has been coughing just today. He wants to unhook himself from all machines and support and go to the bathroom.     Unless otherwise noted in HPI above, patient denies fever, chills, headache, shortness of breath, chest pain, abdominal pain, nausea, vomiting, diarrhea, lightheadedness, dysuria, hematuria, hematochezia, and melena.    Medical Decision Making/Differential Diagnosis/ED Course:    CC/HPI Summary, Social Determinants of health, Records Reviewed, DDx, testing done/not done, ED Course, Reassessment, disposition considerations/shared decision making with patient, consults, disposition:        Relevant PE:  Coarse sounding  right breath sounds  HTN and tachycardic on exam  Vitals:    06/28/25 1651 06/28/25 1655 06/28/25 1658 06/28/25 1700   BP:   (!) 162/86 (!) 162/86   Pulse: (!) 105 (!) 107 (!) 104 (!) 105   Resp: 26 21 18 21   Temp:   98.7 °F (37.1 °C)    TempSrc:   Oral    SpO2:  95% 95%    Weight:           Ddx considered include:  Pneumonia, PE, MI, pneumonitis, obesity hypoventilation    EKG: Ventricular rate 102 bpm, sinus tachycardia with possible left atrial enlargement, QTc 456 ms.  No acute ST elevations or depressions.    Sig results and tx:  Patient remained quite hypertensive, he is not entirely sure if he took his hypertension medications today or not though however.  After some labetalol and hydralazine his blood pressure did improve.  Patient no longer required oxygen mask upon arrival.  He has been able to ambulate throughout the ED on room air without trouble.  He is now satting in the mid 90s.  We will provide her pulm referral given his history, his CT is concerning for pneumonia in his right lung which is where he has coarse sounds.     Medications   labetalol (NORMODYNE;TRANDATE) injection 5 mg (5 mg IntraVENous Given 6/28/25 1318)   ipratropium 0.5 mg-albuterol 2.5 mg (DUONEB) nebulizer solution 1 Dose (1 Dose Inhalation Given 6/28/25 1320)   dexAMETHasone (DECADRON) injection 10 mg (10 mg IntraVENous Given 6/28/25 1317)   iopamidol (ISOVUE-370) 76 % injection 75 mL (75 mLs IntraVENous Given 6/28/25 1440)   hydrALAZINE (APRESOLINE) injection 10 mg (10 mg IntraVENous Given 6/28/25 1541)   cefTRIAXone (ROCEPHIN) 2,000 mg in sterile water 20 mL IV syringe (2,000 mg IntraVENous Given 6/28/25 1542)   doxycycline hyclate (VIBRAMYCIN) capsule 100 mg (100 mg Oral Given 6/28/25 1543)   labetalol (NORMODYNE;TRANDATE) injection 10 mg (10 mg IntraVENous Given 6/28/25 1637)      Is this patient to be included in the SEP-1 core measure? No Exclusion criteria - the patient is NOT to be included for SEP-1 Core Measure due to:

## 2025-06-30 LAB
EKG ATRIAL RATE: 102 BPM
EKG P AXIS: 50 DEGREES
EKG P-R INTERVAL: 158 MS
EKG Q-T INTERVAL: 350 MS
EKG QRS DURATION: 90 MS
EKG QTC CALCULATION (BAZETT): 456 MS
EKG R AXIS: 26 DEGREES
EKG T AXIS: 31 DEGREES
EKG VENTRICULAR RATE: 102 BPM

## 2025-06-30 PROCEDURE — 93010 ELECTROCARDIOGRAM REPORT: CPT | Performed by: INTERNAL MEDICINE

## 2025-07-01 ENCOUNTER — OFFICE VISIT (OUTPATIENT)
Dept: PRIMARY CARE CLINIC | Age: 40
End: 2025-07-01
Payer: COMMERCIAL

## 2025-07-01 VITALS
WEIGHT: 315 LBS | HEART RATE: 97 BPM | SYSTOLIC BLOOD PRESSURE: 130 MMHG | HEIGHT: 75 IN | BODY MASS INDEX: 39.17 KG/M2 | TEMPERATURE: 97.8 F | OXYGEN SATURATION: 98 % | DIASTOLIC BLOOD PRESSURE: 80 MMHG

## 2025-07-01 DIAGNOSIS — G47.30 SLEEP APNEA, UNSPECIFIED TYPE: ICD-10-CM

## 2025-07-01 DIAGNOSIS — E11.9 TYPE 2 DIABETES MELLITUS WITHOUT COMPLICATION, WITHOUT LONG-TERM CURRENT USE OF INSULIN (HCC): ICD-10-CM

## 2025-07-01 DIAGNOSIS — D75.1 POLYCYTHEMIA: Primary | ICD-10-CM

## 2025-07-01 DIAGNOSIS — I10 PRIMARY HYPERTENSION: ICD-10-CM

## 2025-07-01 DIAGNOSIS — E29.1 HYPOTESTOSTERONEMIA IN MALE: ICD-10-CM

## 2025-07-01 PROCEDURE — 99214 OFFICE O/P EST MOD 30 MIN: CPT | Performed by: FAMILY MEDICINE

## 2025-07-01 PROCEDURE — 3044F HG A1C LEVEL LT 7.0%: CPT | Performed by: FAMILY MEDICINE

## 2025-07-01 PROCEDURE — 3079F DIAST BP 80-89 MM HG: CPT | Performed by: FAMILY MEDICINE

## 2025-07-01 PROCEDURE — 3075F SYST BP GE 130 - 139MM HG: CPT | Performed by: FAMILY MEDICINE

## 2025-07-01 NOTE — PROGRESS NOTES
25     Marcel Cornell    : 1985 Sex: male   Age: 40 y.o.      Chief Complaint   Patient presents with    Follow-Up from Hospital       Prior to Admission medications    Medication Sig Start Date End Date Taking? Authorizing Provider   albuterol sulfate HFA (VENTOLIN HFA) 108 (90 Base) MCG/ACT inhaler Inhale 2 puffs into the lungs 4 times daily as needed for Wheezing 25   Ericka Jimenez DO   cefdinir (OMNICEF) 300 MG capsule Take 1 capsule by mouth 2 times daily for 7 days 25  Ericka Jimenez DO   doxycycline hyclate (VIBRA-TABS) 100 MG tablet Take 1 tablet by mouth 2 times daily for 7 days 25  Ericka Jimenez DO   predniSONE (DELTASONE) 50 MG tablet Take 1 tablet by mouth daily for 5 days 6/28/25 7/3/25  Ericka Jimenez,    metoprolol succinate (TOPROL XL) 100 MG extended release tablet TAKE ONE TABLET BY MOUTH ONCE DAILY 25   Godfrey Shelton DO   telmisartan (MICARDIS) 80 MG tablet TAKE 1 TABLET BY MOUTH ONCE DAILY 25   Godfrey Shelton DO   Semaglutide (RYBELSUS) 14 MG TABS Take 14 mg by mouth daily 25   Godfrey Shelton DO   omeprazole (PRILOSEC) 40 MG delayed release capsule Take 1 capsule by mouth daily 25   Godfrey Shelton DO   escitalopram (LEXAPRO) 20 MG tablet Take 1 tablet by mouth daily 25   Godfrey Shelton DO   testosterone cypionate (DEPOTESTOTERONE CYPIONATE) 200 MG/ML injection Inject 0.5 mLs into the muscle once a week. 25   Larry Peralta MD   furosemide (LASIX) 20 MG tablet Take 1 tablet by mouth daily    Larry Peralta MD   fluticasone (FLONASE) 50 MCG/ACT nasal spray 1 spray by Each Nostril route daily as needed for Rhinitis    Larry Peralta MD   loratadine (CLARITIN) 10 MG tablet Take 1 tablet by mouth daily    Larry Peralta MD   busPIRone (BUSPAR) 10 MG tablet TAKE 1 TABLET BY MOUTH TWICE DAILY AS NEEDED 10/20/21   Provider, MD Larry   metFORMIN

## 2025-07-25 ENCOUNTER — HOSPITAL ENCOUNTER (OUTPATIENT)
Age: 40
Discharge: HOME | End: 2025-07-25
Payer: COMMERCIAL

## 2025-07-25 DIAGNOSIS — D75.1 POLYCYTHEMIA: ICD-10-CM

## 2025-07-25 DIAGNOSIS — I10 PRIMARY HYPERTENSION: ICD-10-CM

## 2025-07-25 LAB
ALBUMIN SERPL-MCNC: 4.2 G/DL (ref 3.5–5.2)
ALP SERPL-CCNC: 50 U/L (ref 40–129)
ALT SERPL-CCNC: 27 U/L (ref 0–50)
ANION GAP SERPL CALCULATED.3IONS-SCNC: 11 MMOL/L (ref 7–16)
AST SERPL-CCNC: 23 U/L (ref 0–50)
BASOPHILS # BLD: 0.02 K/UL (ref 0–0.2)
BASOPHILS NFR BLD: 0 % (ref 0–2)
BILIRUB SERPL-MCNC: 0.6 MG/DL (ref 0–1.2)
BUN SERPL-MCNC: 20 MG/DL (ref 6–20)
CALCIUM SERPL-MCNC: 9 MG/DL (ref 8.6–10)
CHLORIDE SERPL-SCNC: 106 MMOL/L (ref 98–107)
CO2 SERPL-SCNC: 24 MMOL/L (ref 22–29)
CREAT SERPL-MCNC: 1 MG/DL (ref 0.7–1.2)
CRP SERPL HS-MCNC: 3.8 MG/L (ref 0–5)
EOSINOPHIL # BLD: 0.15 K/UL (ref 0.05–0.5)
EOSINOPHILS RELATIVE PERCENT: 2 % (ref 0–6)
ERYTHROCYTE [DISTWIDTH] IN BLOOD BY AUTOMATED COUNT: 15.4 % (ref 11.5–15)
GFR, ESTIMATED: >90 ML/MIN/1.73M2
GLUCOSE SERPL-MCNC: 153 MG/DL (ref 74–99)
HCT VFR BLD AUTO: 48.2 % (ref 37–54)
HGB BLD-MCNC: 16.1 G/DL (ref 12.5–16.5)
IMM GRANULOCYTES # BLD AUTO: 0.03 K/UL (ref 0–0.58)
IMM GRANULOCYTES NFR BLD: 0 % (ref 0–5)
LYMPHOCYTES NFR BLD: 1.24 K/UL (ref 1.5–4)
LYMPHOCYTES RELATIVE PERCENT: 17 % (ref 20–42)
MCH RBC QN AUTO: 26.8 PG (ref 26–35)
MCHC RBC AUTO-ENTMCNC: 33.4 G/DL (ref 32–34.5)
MCV RBC AUTO: 80.2 FL (ref 80–99.9)
MONOCYTES NFR BLD: 0.53 K/UL (ref 0.1–0.95)
MONOCYTES NFR BLD: 7 % (ref 2–12)
NEUTROPHILS NFR BLD: 73 % (ref 43–80)
NEUTS SEG NFR BLD: 5.4 K/UL (ref 1.8–7.3)
PLATELET # BLD AUTO: 263 K/UL (ref 130–450)
PMV BLD AUTO: 10.3 FL (ref 7–12)
POTASSIUM SERPL-SCNC: 4.1 MMOL/L (ref 3.5–5.1)
PROT SERPL-MCNC: 7 G/DL (ref 6.4–8.3)
RBC # BLD AUTO: 6.01 M/UL (ref 3.8–5.8)
SODIUM SERPL-SCNC: 140 MMOL/L (ref 136–145)
WBC OTHER # BLD: 7.4 K/UL (ref 4.5–11.5)

## 2025-07-25 PROCEDURE — 86140 C-REACTIVE PROTEIN: CPT

## 2025-07-25 PROCEDURE — 80053 COMPREHEN METABOLIC PANEL: CPT

## 2025-07-25 PROCEDURE — 36415 COLL VENOUS BLD VENIPUNCTURE: CPT

## 2025-07-25 PROCEDURE — 85025 COMPLETE CBC W/AUTO DIFF WBC: CPT

## 2025-07-29 ENCOUNTER — TRANSCRIBE ORDERS (OUTPATIENT)
Age: 40
End: 2025-07-29

## 2025-07-29 ENCOUNTER — TRANSCRIBE ORDERS (OUTPATIENT)
Dept: ADMINISTRATIVE | Age: 40
End: 2025-07-29

## 2025-07-29 ENCOUNTER — OFFICE VISIT (OUTPATIENT)
Dept: PRIMARY CARE CLINIC | Age: 40
End: 2025-07-29
Payer: COMMERCIAL

## 2025-07-29 VITALS
HEIGHT: 75 IN | OXYGEN SATURATION: 98 % | BODY MASS INDEX: 39.17 KG/M2 | SYSTOLIC BLOOD PRESSURE: 130 MMHG | HEART RATE: 80 BPM | TEMPERATURE: 98 F | DIASTOLIC BLOOD PRESSURE: 80 MMHG | WEIGHT: 315 LBS

## 2025-07-29 DIAGNOSIS — R91.8 GROUND GLASS OPACITY PRESENT ON IMAGING OF LUNG: Primary | ICD-10-CM

## 2025-07-29 DIAGNOSIS — R06.09 DYSPNEA ON EXERTION: ICD-10-CM

## 2025-07-29 DIAGNOSIS — R06.09 DYSPNEA ON EXERTION: Primary | ICD-10-CM

## 2025-07-29 DIAGNOSIS — E29.1 HYPOTESTOSTERONEMIA IN MALE: ICD-10-CM

## 2025-07-29 DIAGNOSIS — R06.00 DYSPNEA, UNSPECIFIED TYPE: ICD-10-CM

## 2025-07-29 DIAGNOSIS — I10 PRIMARY HYPERTENSION: ICD-10-CM

## 2025-07-29 DIAGNOSIS — G47.30 SLEEP APNEA, UNSPECIFIED TYPE: ICD-10-CM

## 2025-07-29 DIAGNOSIS — D75.1 POLYCYTHEMIA: ICD-10-CM

## 2025-07-29 DIAGNOSIS — E11.9 TYPE 2 DIABETES MELLITUS WITHOUT COMPLICATION, WITHOUT LONG-TERM CURRENT USE OF INSULIN (HCC): Primary | ICD-10-CM

## 2025-07-29 PROCEDURE — 99214 OFFICE O/P EST MOD 30 MIN: CPT | Performed by: FAMILY MEDICINE

## 2025-07-29 PROCEDURE — 3079F DIAST BP 80-89 MM HG: CPT | Performed by: FAMILY MEDICINE

## 2025-07-29 PROCEDURE — 3075F SYST BP GE 130 - 139MM HG: CPT | Performed by: FAMILY MEDICINE

## 2025-07-29 PROCEDURE — 3044F HG A1C LEVEL LT 7.0%: CPT | Performed by: FAMILY MEDICINE

## 2025-07-29 ASSESSMENT — ENCOUNTER SYMPTOMS
RESPIRATORY NEGATIVE: 1
ALLERGIC/IMMUNOLOGIC NEGATIVE: 1
EYES NEGATIVE: 1
GASTROINTESTINAL NEGATIVE: 1

## 2025-07-29 NOTE — PROGRESS NOTES
25     Marcel Cornell    : 1985 Sex: male   Age: 40 y.o.      Chief Complaint   Patient presents with    Follow-up       Prior to Admission medications    Medication Sig Start Date End Date Taking? Authorizing Provider   albuterol sulfate HFA (VENTOLIN HFA) 108 (90 Base) MCG/ACT inhaler Inhale 2 puffs into the lungs 4 times daily as needed for Wheezing 25   Ericka Jimenez,    metoprolol succinate (TOPROL XL) 100 MG extended release tablet TAKE ONE TABLET BY MOUTH ONCE DAILY 25   Godfrey Shelton DO   telmisartan (MICARDIS) 80 MG tablet TAKE 1 TABLET BY MOUTH ONCE DAILY 25   Godfrey Shelton DO   Semaglutide (RYBELSUS) 14 MG TABS Take 14 mg by mouth daily 25   Godfrey Shelton DO   omeprazole (PRILOSEC) 40 MG delayed release capsule Take 1 capsule by mouth daily 25   Godfrey Shelton DO   escitalopram (LEXAPRO) 20 MG tablet Take 1 tablet by mouth daily 25   Godfrey Shelton DO   testosterone cypionate (DEPOTESTOTERONE CYPIONATE) 200 MG/ML injection Inject 0.5 mLs into the muscle once a week. 25   Larry Peralta MD   furosemide (LASIX) 20 MG tablet Take 1 tablet by mouth daily    Larry Peralta MD   fluticasone (FLONASE) 50 MCG/ACT nasal spray 1 spray by Each Nostril route daily as needed for Rhinitis    Larry Peralta MD   loratadine (CLARITIN) 10 MG tablet Take 1 tablet by mouth daily    Larry Peralta MD   busPIRone (BUSPAR) 10 MG tablet TAKE 1 TABLET BY MOUTH TWICE DAILY AS NEEDED 10/20/21   Larry Peralta MD   metFORMIN (GLUCOPHAGE-XR) 500 MG extended release tablet 1 po bid 10/8/21   Larry Peralta MD   sildenafil (REVATIO) 20 MG tablet TAKE 1 TABLET BY MOUTH FIVE TIMES DAILY AS NEEDED 10/8/21   Larry Peralta MD          HPI: Patient evaluated today with diabetes polycythemia hypertension sleep apnea.  Overall medically he is doing quite well at this time.  Labs did improve.  Hemoglobin back to

## 2025-07-30 RX ORDER — TELMISARTAN 80 MG/1
80 TABLET ORAL DAILY
Qty: 30 TABLET | Refills: 1 | Status: SHIPPED | OUTPATIENT
Start: 2025-07-30

## 2025-08-13 ENCOUNTER — HOSPITAL ENCOUNTER (OUTPATIENT)
Dept: CARDIOLOGY | Age: 40
Discharge: HOME | End: 2025-08-15
Payer: COMMERCIAL

## 2025-08-13 VITALS
DIASTOLIC BLOOD PRESSURE: 80 MMHG | WEIGHT: 315 LBS | HEIGHT: 75 IN | SYSTOLIC BLOOD PRESSURE: 130 MMHG | BODY MASS INDEX: 39.17 KG/M2

## 2025-08-13 DIAGNOSIS — R06.09 DYSPNEA ON EXERTION: ICD-10-CM

## 2025-08-13 LAB
ECHO AO ASC DIAM: 3.3 CM
ECHO AO ASCENDING AORTA INDEX: 1.09 CM/M2
ECHO AO SINUS VALSALVA DIAM: 3.4 CM
ECHO AO SINUS VALSALVA INDEX: 1.13 CM/M2
ECHO AO ST JNCT DIAM: 3.1 CM
ECHO AV AREA PEAK VELOCITY: 3.2 CM2
ECHO AV AREA VTI: 3.5 CM2
ECHO AV AREA/BSA PEAK VELOCITY: 1.1 CM2/M2
ECHO AV AREA/BSA VTI: 1.2 CM2/M2
ECHO AV CUSP MM: 2.6 CM
ECHO AV MEAN GRADIENT: 4 MMHG
ECHO AV MEAN VELOCITY: 0.9 M/S
ECHO AV PEAK GRADIENT: 7 MMHG
ECHO AV PEAK VELOCITY: 1.3 M/S
ECHO AV VELOCITY RATIO: 0.69
ECHO AV VTI: 27.1 CM
ECHO BSA: 3.19 M2
ECHO EST RA PRESSURE: 3 MMHG
ECHO IVC PROX: 2.1 CM
ECHO LA DIAMETER INDEX: 1.89 CM/M2
ECHO LA DIAMETER: 5.7 CM
ECHO LA VOL A-L A2C: 138 ML (ref 18–58)
ECHO LA VOL A-L A4C: 119 ML (ref 18–58)
ECHO LA VOL MOD A2C: 133 ML (ref 18–58)
ECHO LA VOL MOD A4C: 115 ML (ref 18–58)
ECHO LA VOLUME AREA LENGTH: 134 ML
ECHO LA VOLUME INDEX A-L A2C: 46 ML/M2 (ref 16–34)
ECHO LA VOLUME INDEX A-L A4C: 39 ML/M2 (ref 16–34)
ECHO LA VOLUME INDEX AREA LENGTH: 44 ML/M2 (ref 16–34)
ECHO LA VOLUME INDEX MOD A2C: 44 ML/M2 (ref 16–34)
ECHO LA VOLUME INDEX MOD A4C: 38 ML/M2 (ref 16–34)
ECHO LV CARDIAC INDEX: 2.4 L/MIN/M2
ECHO LV E' LATERAL VELOCITY: 0.05 CM/S
ECHO LV E' SEPTAL VELOCITY: 0.06 CM/S
ECHO LV EDV A2C: 221 ML
ECHO LV EDV A4C: 232 ML
ECHO LV EDV BP: 231 ML (ref 67–155)
ECHO LV EDV INDEX A4C: 77 ML/M2
ECHO LV EDV INDEX BP: 76 ML/M2
ECHO LV EDV NDEX A2C: 73 ML/M2
ECHO LV EF PHYSICIAN: 45 %
ECHO LV EJECTION FRACTION A2C: 38 %
ECHO LV EJECTION FRACTION A4C: 46 %
ECHO LV EJECTION FRACTION BIPLANE: 42 % (ref 55–100)
ECHO LV ESV A2C: 137 ML
ECHO LV ESV A4C: 125 ML
ECHO LV ESV BP: 134 ML (ref 22–58)
ECHO LV ESV INDEX A2C: 45 ML/M2
ECHO LV ESV INDEX A4C: 41 ML/M2
ECHO LV ESV INDEX BP: 44 ML/M2
ECHO LV FRACTIONAL SHORTENING: 24 % (ref 28–44)
ECHO LV INTERNAL DIMENSION DIASTOLE INDEX: 2.09 CM/M2
ECHO LV INTERNAL DIMENSION DIASTOLIC: 6.3 CM (ref 4.2–5.9)
ECHO LV INTERNAL DIMENSION SYSTOLIC INDEX: 1.59 CM/M2
ECHO LV INTERNAL DIMENSION SYSTOLIC: 4.8 CM
ECHO LV ISOVOLUMETRIC RELAXATION TIME (IVRT): 106.1 MS
ECHO LV IVSD: 1.5 CM (ref 0.6–1)
ECHO LV IVSS: 1.7 CM
ECHO LV MASS 2D: 551.9 G (ref 88–224)
ECHO LV MASS INDEX 2D: 182.7 G/M2 (ref 49–115)
ECHO LV POSTERIOR WALL DIASTOLIC: 1.9 CM (ref 0.6–1)
ECHO LV POSTERIOR WALL SYSTOLIC: 2.4 CM
ECHO LV RELATIVE WALL THICKNESS RATIO: 0.6
ECHO LVOT AREA: 4.5 CM2
ECHO LVOT AV VTI INDEX: 0.76
ECHO LVOT CARDIAC OUTPUT: 7.3 L/MIN
ECHO LVOT DIAM: 2.4 CM
ECHO LVOT MEAN GRADIENT: 2 MMHG
ECHO LVOT PEAK GRADIENT: 3 MMHG
ECHO LVOT PEAK VELOCITY: 0.9 M/S
ECHO LVOT STROKE VOLUME INDEX: 31 ML/M2
ECHO LVOT SV: 93.6 ML
ECHO LVOT VTI: 20.7 CM
ECHO MV "A" WAVE DURATION: 129.2 MSEC
ECHO MV A VELOCITY: 0.91 M/S
ECHO MV AREA PHT: 3.1 CM2
ECHO MV AREA VTI: 3 CM2
ECHO MV E DECELERATION TIME (DT): 221.7 MS
ECHO MV E VELOCITY: 0.94 M/S
ECHO MV E/A RATIO: 1.03
ECHO MV E/E' LATERAL: 1880
ECHO MV E/E' RATIO (AVERAGED): 1723.33
ECHO MV E/E' SEPTAL: 1566.67
ECHO MV LVOT VTI INDEX: 1.5
ECHO MV MAX VELOCITY: 1.2 M/S
ECHO MV MEAN GRADIENT: 3 MMHG
ECHO MV MEAN VELOCITY: 0.9 M/S
ECHO MV PEAK GRADIENT: 6 MMHG
ECHO MV PRESSURE HALF TIME (PHT): 70 MS
ECHO MV VTI: 31.1 CM
ECHO PV MAX VELOCITY: 1 M/S
ECHO PV MEAN GRADIENT: 2 MMHG
ECHO PV MEAN VELOCITY: 0.7 M/S
ECHO PV PEAK GRADIENT: 4 MMHG
ECHO PV VTI: 24.7 CM
ECHO PVEIN A DURATION: 161.5 MS
ECHO PVEIN A VELOCITY: 0.2 M/S
ECHO PVEIN PEAK D VELOCITY: 0.5 M/S
ECHO PVEIN PEAK S VELOCITY: 0.6 M/S
ECHO PVEIN S/D RATIO: 1.2
ECHO RA AREA 4C: 28.5 CM2
ECHO RA VOLUME AREA LENGTH APICAL 4 CHAMBER: 1 ML
ECHO RIGHT VENTRICULAR SYSTOLIC PRESSURE (RVSP): 28 MMHG
ECHO RV BASAL DIMENSION: 4.5 CM
ECHO RV INTERNAL DIMENSION: 4.9 CM
ECHO RV LONGITUDINAL DIMENSION: 8.5 CM
ECHO RV MID DIMENSION: 5 CM
ECHO RV TAPSE: 3 CM (ref 1.7–?)
ECHO TV REGURGITANT MAX VELOCITY: 2.49 M/S
ECHO TV REGURGITANT PEAK GRADIENT: 25 MMHG

## 2025-08-13 PROCEDURE — 93306 TTE W/DOPPLER COMPLETE: CPT

## 2025-08-13 PROCEDURE — 93306 TTE W/DOPPLER COMPLETE: CPT | Performed by: INTERNAL MEDICINE

## 2025-08-25 ENCOUNTER — OFFICE VISIT (OUTPATIENT)
Dept: SLEEP MEDICINE | Age: 40
End: 2025-08-25
Payer: COMMERCIAL

## 2025-08-25 VITALS
OXYGEN SATURATION: 98 % | WEIGHT: 315 LBS | HEIGHT: 75 IN | SYSTOLIC BLOOD PRESSURE: 158 MMHG | RESPIRATION RATE: 14 BRPM | TEMPERATURE: 97.9 F | DIASTOLIC BLOOD PRESSURE: 85 MMHG | BODY MASS INDEX: 39.17 KG/M2 | HEART RATE: 81 BPM

## 2025-08-25 DIAGNOSIS — G47.33 OSA (OBSTRUCTIVE SLEEP APNEA): Primary | ICD-10-CM

## 2025-08-25 PROCEDURE — 3077F SYST BP >= 140 MM HG: CPT | Performed by: STUDENT IN AN ORGANIZED HEALTH CARE EDUCATION/TRAINING PROGRAM

## 2025-08-25 PROCEDURE — 3079F DIAST BP 80-89 MM HG: CPT | Performed by: STUDENT IN AN ORGANIZED HEALTH CARE EDUCATION/TRAINING PROGRAM

## 2025-08-25 PROCEDURE — 99214 OFFICE O/P EST MOD 30 MIN: CPT | Performed by: STUDENT IN AN ORGANIZED HEALTH CARE EDUCATION/TRAINING PROGRAM

## 2025-08-25 ASSESSMENT — SLEEP AND FATIGUE QUESTIONNAIRES
HOW LIKELY ARE YOU TO NOD OFF OR FALL ASLEEP WHILE SITTING QUIETLY AFTER LUNCH WITHOUT ALCOHOL: WOULD NEVER DOZE
HOW LIKELY ARE YOU TO NOD OFF OR FALL ASLEEP WHILE SITTING AND READING: WOULD NEVER DOZE
HOW LIKELY ARE YOU TO NOD OFF OR FALL ASLEEP WHILE SITTING INACTIVE IN A PUBLIC PLACE: WOULD NEVER DOZE
ESS TOTAL SCORE: 4
HOW LIKELY ARE YOU TO NOD OFF OR FALL ASLEEP IN A CAR, WHILE STOPPED FOR A FEW MINUTES IN TRAFFIC: WOULD NEVER DOZE
HOW LIKELY ARE YOU TO NOD OFF OR FALL ASLEEP WHILE SITTING AND TALKING TO SOMEONE: WOULD NEVER DOZE
HOW LIKELY ARE YOU TO NOD OFF OR FALL ASLEEP WHILE LYING DOWN TO REST IN THE AFTERNOON WHEN CIRCUMSTANCES PERMIT: MODERATE CHANCE OF DOZING
HOW LIKELY ARE YOU TO NOD OFF OR FALL ASLEEP WHILE WATCHING TV: WOULD NEVER DOZE
HOW LIKELY ARE YOU TO NOD OFF OR FALL ASLEEP WHEN YOU ARE A PASSENGER IN A CAR FOR AN HOUR WITHOUT A BREAK: MODERATE CHANCE OF DOZING

## 2025-10-17 ENCOUNTER — APPOINTMENT (OUTPATIENT)
Dept: CARDIOLOGY | Facility: HOSPITAL | Age: 40
End: 2025-10-17
Payer: MEDICARE